# Patient Record
Sex: MALE | Race: BLACK OR AFRICAN AMERICAN | NOT HISPANIC OR LATINO | Employment: FULL TIME | URBAN - METROPOLITAN AREA
[De-identification: names, ages, dates, MRNs, and addresses within clinical notes are randomized per-mention and may not be internally consistent; named-entity substitution may affect disease eponyms.]

---

## 2022-12-06 DIAGNOSIS — I35.0 AORTIC VALVE STENOSIS: Primary | ICD-10-CM

## 2022-12-12 ENCOUNTER — OFFICE VISIT (OUTPATIENT)
Dept: CARDIAC SURGERY | Facility: CLINIC | Age: 62
End: 2022-12-12
Payer: COMMERCIAL

## 2022-12-12 VITALS
WEIGHT: 136 LBS | BODY MASS INDEX: 18.02 KG/M2 | HEIGHT: 73 IN | DIASTOLIC BLOOD PRESSURE: 73 MMHG | SYSTOLIC BLOOD PRESSURE: 127 MMHG | HEART RATE: 82 BPM

## 2022-12-12 DIAGNOSIS — I35.0 AORTIC VALVE STENOSIS: Primary | ICD-10-CM

## 2022-12-12 DIAGNOSIS — I35.0 MODERATE TO SEVERE AORTIC STENOSIS: ICD-10-CM

## 2022-12-12 DIAGNOSIS — I25.10 CORONARY ARTERY DISEASE INVOLVING NATIVE CORONARY ARTERY OF NATIVE HEART WITHOUT ANGINA PECTORIS: ICD-10-CM

## 2022-12-12 DIAGNOSIS — K02.9: ICD-10-CM

## 2022-12-12 PROCEDURE — 1160F RVW MEDS BY RX/DR IN RCRD: CPT | Mod: CPTII,,, | Performed by: THORACIC SURGERY (CARDIOTHORACIC VASCULAR SURGERY)

## 2022-12-12 PROCEDURE — 3078F PR MOST RECENT DIASTOLIC BLOOD PRESSURE < 80 MM HG: ICD-10-PCS | Mod: CPTII,,, | Performed by: THORACIC SURGERY (CARDIOTHORACIC VASCULAR SURGERY)

## 2022-12-12 PROCEDURE — 99205 OFFICE O/P NEW HI 60 MIN: CPT | Mod: ,,, | Performed by: THORACIC SURGERY (CARDIOTHORACIC VASCULAR SURGERY)

## 2022-12-12 PROCEDURE — 1159F PR MEDICATION LIST DOCUMENTED IN MEDICAL RECORD: ICD-10-PCS | Mod: CPTII,,, | Performed by: THORACIC SURGERY (CARDIOTHORACIC VASCULAR SURGERY)

## 2022-12-12 PROCEDURE — 3074F PR MOST RECENT SYSTOLIC BLOOD PRESSURE < 130 MM HG: ICD-10-PCS | Mod: CPTII,,, | Performed by: THORACIC SURGERY (CARDIOTHORACIC VASCULAR SURGERY)

## 2022-12-12 PROCEDURE — 99205 PR OFFICE/OUTPT VISIT, NEW, LEVL V, 60-74 MIN: ICD-10-PCS | Mod: ,,, | Performed by: THORACIC SURGERY (CARDIOTHORACIC VASCULAR SURGERY)

## 2022-12-12 PROCEDURE — 1159F MED LIST DOCD IN RCRD: CPT | Mod: CPTII,,, | Performed by: THORACIC SURGERY (CARDIOTHORACIC VASCULAR SURGERY)

## 2022-12-12 PROCEDURE — 3078F DIAST BP <80 MM HG: CPT | Mod: CPTII,,, | Performed by: THORACIC SURGERY (CARDIOTHORACIC VASCULAR SURGERY)

## 2022-12-12 PROCEDURE — 3074F SYST BP LT 130 MM HG: CPT | Mod: CPTII,,, | Performed by: THORACIC SURGERY (CARDIOTHORACIC VASCULAR SURGERY)

## 2022-12-12 PROCEDURE — 3008F BODY MASS INDEX DOCD: CPT | Mod: CPTII,,, | Performed by: THORACIC SURGERY (CARDIOTHORACIC VASCULAR SURGERY)

## 2022-12-12 PROCEDURE — 1160F PR REVIEW ALL MEDS BY PRESCRIBER/CLIN PHARMACIST DOCUMENTED: ICD-10-PCS | Mod: CPTII,,, | Performed by: THORACIC SURGERY (CARDIOTHORACIC VASCULAR SURGERY)

## 2022-12-12 PROCEDURE — 3008F PR BODY MASS INDEX (BMI) DOCUMENTED: ICD-10-PCS | Mod: CPTII,,, | Performed by: THORACIC SURGERY (CARDIOTHORACIC VASCULAR SURGERY)

## 2022-12-12 RX ORDER — TAMSULOSIN HYDROCHLORIDE 0.4 MG/1
2 CAPSULE ORAL DAILY
COMMUNITY
Start: 2022-11-17

## 2022-12-12 RX ORDER — AMLODIPINE BESYLATE 5 MG/1
5 TABLET ORAL DAILY
COMMUNITY
Start: 2022-11-17

## 2022-12-12 NOTE — ASSESSMENT & PLAN NOTE
Patient needs aortic valve replacement.  Risks, benefits, and alternatives have been discussed.  Questions have been answered.  He will need to have multiple dental extractions prior to aortic valve replacement to obviate the potential for prosthetic valve endocarditis.  I have discussed this with his daughters and they have promised to have this resolved within the next 2 weeks.  I have explained to him the seriousness of the situation and the potential for sudden cardiac death and/or severe myocardial infarction.  He has voiced some concerns and hesitation and says that he will think about it.

## 2022-12-12 NOTE — ASSESSMENT & PLAN NOTE
Patient will need examination and dental extraction prior to AVR/CABG.  Discussed with the patient's daughters and they have found to have this done in the next 2 weeks.   No

## 2022-12-12 NOTE — PROGRESS NOTES
[facility]  Cardiothoracic Surgery  Consult Note    Patient Name: Travis Mckay  MRN: [id number]  Date of Service: 12/12/2022  Referring Provider: Usama Josue II, MD    Patient information was obtained from patient, past medical records, and primary team    Subjective:     Chief Complaint   Patient presents with    Pre-op Exam      RE: CABG/AVR       History of Present Illness:  The patient is a 62-year-old  in [place] who now comes at the request of Dr. Simons to have an evaluation for aortic valve replacement and coronary artery bypass grafting.  Echocardiogram reveals moderate to severe aortic stenosis with an aortic valve area calculated to be 0.9 sq cm by continuity equation, peak systolic velocity of 3.4 m/sec, and a mean aortic valve gradient of 31 mmHg.  The patient's DVI is 0.25 and he has normal left ventricular function.  Of note, his pulmonary artery systolic pressure was estimated to be 40 mmHg on echocardiogram.  Left heart catheterization reveals severe, complex LAD/diagonal disease but is otherwise without evidence of flow-limiting disease.    The patient denies fever, chills, nausea, vomiting, headache, syncope, chest pain, back pain, or paroxysmal nocturnal dyspnea.  He does note some periodic shortness of breath and dyspnea on exertion which comes and goes.    Current Outpatient Medications on File Prior to Visit   Medication Sig    amLODIPine (NORVASC) 5 MG tablet Take 5 mg by mouth once daily.    tamsulosin (FLOMAX) 0.4 mg Cap Take 2 capsules by mouth once daily.     No current facility-administered medications on file prior to visit.       Review of patient's allergies indicates:  No Known Allergies    Past Medical History:   Diagnosis Date    Hypertension      History reviewed. No pertinent surgical history.  Family History    None       Tobacco Use    Smoking status: Every Day     Packs/day: 0.50     Types: Cigarettes     Smokeless tobacco: Never   Substance and Sexual Activity    Alcohol use: Not on file    Drug use: Not on file    Sexual activity: Not on file     Review of Systems   Constitutional: Negative. Negative for chills, diaphoresis, fever and night sweats.   HENT:  Negative for hoarse voice, sore throat and stridor.    Eyes: Negative.  Negative for blurred vision and double vision.   Cardiovascular:  Negative for chest pain, claudication, cyanosis, dyspnea on exertion, irregular heartbeat, leg swelling, orthopnea, palpitations, paroxysmal nocturnal dyspnea and syncope.   Respiratory:  Negative for cough, hemoptysis, shortness of breath, sputum production and wheezing.    Endocrine: Negative for polydipsia and polyuria.   Hematologic/Lymphatic: Negative for adenopathy and bleeding problem.   Gastrointestinal:  Negative for abdominal pain, diarrhea, heartburn, hematemesis, hematochezia, nausea and vomiting.   Neurological:  Negative for brief paralysis, disturbances in coordination, dizziness, focal weakness, headaches, numbness and paresthesias.   Objective:     Vitals:    12/12/22 0829   BP: 127/73   Pulse: 82        Weight: 61.7 kg (136 lb)  Body mass index is 17.94 kg/m².     STS Risk Score: 1/12%    Physical Exam  Vitals reviewed.   Constitutional:       General: He is not in acute distress.     Appearance: He is cachectic.   HENT:      Head: Normocephalic and atraumatic.      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     Nose: No congestion or rhinorrhea.      Mouth/Throat:      Mouth: Mucous membranes are moist.      Dentition: Abnormal dentition. Gingival swelling, dental caries and gum lesions present.      Pharynx: Oropharynx is clear. No oropharyngeal exudate or posterior oropharyngeal erythema.   Eyes:      Extraocular Movements: Extraocular movements intact.      Conjunctiva/sclera: Conjunctivae normal.      Pupils: Pupils are equal, round,  and reactive to light.   Neck:      Thyroid: No thyroid mass.      Vascular: No carotid bruit or JVD.   Cardiovascular:      Rate and Rhythm: Normal rate and regular rhythm.      Pulses: Normal pulses.           Carotid pulses are 2+ on the right side and 2+ on the left side.       Radial pulses are 2+ on the right side and 2+ on the left side.        Femoral pulses are 2+ on the right side and 2+ on the left side.       Dorsalis pedis pulses are 2+ on the right side and 2+ on the left side.        Posterior tibial pulses are 2+ on the right side and 2+ on the left side.      Heart sounds: Murmur heard.   Harsh midsystolic murmur is present with a grade of 3/6 at the upper right sternal border radiating to the neck.   High-pitched blowing decrescendo early diastolic murmur is present at the upper right sternal border radiating to the apex.     No friction rub. No gallop.   Pulmonary:      Effort: Pulmonary effort is normal. No respiratory distress.      Breath sounds: No wheezing, rhonchi or rales.   Chest:      Chest wall: No tenderness.   Abdominal:      General: Abdomen is flat. Bowel sounds are normal. There is no distension.      Palpations: Abdomen is soft. There is no mass.      Tenderness: There is no abdominal tenderness.   Musculoskeletal:         General: No swelling. Normal range of motion.      Cervical back: Normal range of motion and neck supple.      Right lower leg: No edema.      Left lower leg: No edema.   Lymphadenopathy:      Cervical: No cervical adenopathy.      Upper Body:      Right upper body: No supraclavicular or axillary adenopathy.      Left upper body: No supraclavicular or axillary adenopathy.   Skin:     General: Skin is warm and dry.   Neurological:      General: No focal deficit present.      Mental Status: He is alert and oriented to person, place, and time.      Cranial Nerves: No cranial nerve deficit.      Sensory: No sensory deficit.      Motor: No weakness.   Psychiatric:          Mood and Affect: Mood normal.        Significant Labs:  Reviewed    Significant Diagnostics:  Reviewed    Assessment/Plan:     Problem List Items Addressed This Visit          ENT    Class VI dental caries     Patient will need examination and dental extraction prior to AVR/CABG.  Discussed with the patient's daughters and they have found to have this done in the next 2 weeks.            Cardiac/Vascular    Moderate to severe aortic stenosis     Patient needs aortic valve replacement.  Risks, benefits, and alternatives have been discussed.  Questions have been answered.  He will need to have multiple dental extractions prior to aortic valve replacement to obviate the potential for prosthetic valve endocarditis.  I have discussed this with his daughters and they have promised to have this resolved within the next 2 weeks.  I have explained to him the seriousness of the situation and the potential for sudden cardiac death and/or severe myocardial infarction.  He has voiced some concerns and hesitation and says that he will think about it.         Coronary artery disease involving native coronary artery of native heart without angina pectoris     Risks, benefits, and alternatives have been discussed.  Questions have been answered.  The patient voices understanding and agrees to proceed however he is having some hesitation and understands that he must have dental extractions prior to aortic valve replacement and coronary artery bypass grafting.  His daughters have agreed to assist him with this in the next couple of weeks and he desires to have revisited station and scheduling of potential surgery after [place].          Other Visit Diagnoses       Aortic valve stenosis                 Thank you for your consult. I will follow-up with patient. Please contact us if you have any additional questions.    TOMMY Sparks MD, FACC, FACS  Cardiothoracic Surgery  [facility]

## 2022-12-12 NOTE — ASSESSMENT & PLAN NOTE
Risks, benefits, and alternatives have been discussed.  Questions have been answered.  The patient voices understanding and agrees to proceed however he is having some hesitation and understands that he must have dental extractions prior to aortic valve replacement and coronary artery bypass grafting.  His daughters have agreed to assist him with this in the next couple of weeks and he desires to have revisited station and scheduling of potential surgery after [place].

## 2022-12-27 ENCOUNTER — TELEPHONE (OUTPATIENT)
Dept: CARDIAC SURGERY | Facility: CLINIC | Age: 62
End: 2022-12-27
Payer: COMMERCIAL

## 2022-12-27 NOTE — TELEPHONE ENCOUNTER
Family states having trouble getting Dentist to pull teeth, states they have been going back and for the with the Cardiologist.  Instructed family to reach out to Dr. Josue-Cardiologist to see if they will approve him to have surgery to extract/pull teeth.  Pt has f/u appt with Dr. Sparks on 1/9/23 inst if unable to get teeth pulled by then can discuss with Dr. Sparks.  Verb understanding.   ----- Message from Mariajose Rodas sent at 12/27/2022  2:23 PM CST -----  Lianet called said the dentist said that he will not pull his teeth before sx, but Dr. Sparks told them to have it done before the sx. Lianet is not sure what to do.    [phone]

## 2023-01-05 ENCOUNTER — TELEPHONE (OUTPATIENT)
Dept: CARDIAC SURGERY | Facility: CLINIC | Age: 63
End: 2023-01-05
Payer: COMMERCIAL

## 2023-01-05 NOTE — TELEPHONE ENCOUNTER
Dr. Sparks states see pt in office after dental surgery for teeth extraction, cancelled appt for 1/9/23.  Spoke with spouse and states Dr. Trevon Kunz Dentist refused to perform dental extraction surgery because they did not get surgery clearance from Dr. Josue.  Called Dr. Josue's office and states pt was high risk but did not say pt couldn't have surgery.  Nurse states she is going to talk to RUSSELL Choi because Dr. Josue out of office until next week.  Informed spouse as soon as I hear back from MD I will let her know.  Verb understanding.

## 2023-01-09 ENCOUNTER — TELEPHONE (OUTPATIENT)
Dept: CARDIAC SURGERY | Facility: CLINIC | Age: 63
End: 2023-01-09

## 2023-01-09 NOTE — TELEPHONE ENCOUNTER
Returned call to Mrs. Guy. Informed her that we are currently working on the clearance and pending a call back from the cardiologist. She verbalized understanding, and asked that we call her back as soon as we know something. MARK ANTHONY Hinds RN

## 2023-01-30 ENCOUNTER — OFFICE VISIT (OUTPATIENT)
Dept: CARDIAC SURGERY | Facility: CLINIC | Age: 63
End: 2023-01-30
Payer: COMMERCIAL

## 2023-01-30 ENCOUNTER — TELEPHONE (OUTPATIENT)
Dept: CARDIAC SURGERY | Facility: CLINIC | Age: 63
End: 2023-01-30

## 2023-01-30 VITALS
BODY MASS INDEX: 18.29 KG/M2 | DIASTOLIC BLOOD PRESSURE: 76 MMHG | WEIGHT: 138 LBS | HEART RATE: 74 BPM | HEIGHT: 73 IN | SYSTOLIC BLOOD PRESSURE: 140 MMHG

## 2023-01-30 DIAGNOSIS — I25.10 CORONARY ARTERY DISEASE INVOLVING NATIVE CORONARY ARTERY OF NATIVE HEART WITHOUT ANGINA PECTORIS: ICD-10-CM

## 2023-01-30 DIAGNOSIS — I35.0 MODERATE TO SEVERE AORTIC STENOSIS: ICD-10-CM

## 2023-01-30 DIAGNOSIS — I35.0 MODERATE TO SEVERE AORTIC STENOSIS: Primary | ICD-10-CM

## 2023-01-30 PROCEDURE — 99215 OFFICE O/P EST HI 40 MIN: CPT | Mod: ,,, | Performed by: THORACIC SURGERY (CARDIOTHORACIC VASCULAR SURGERY)

## 2023-01-30 PROCEDURE — 3077F SYST BP >= 140 MM HG: CPT | Mod: CPTII,,, | Performed by: THORACIC SURGERY (CARDIOTHORACIC VASCULAR SURGERY)

## 2023-01-30 PROCEDURE — 3078F PR MOST RECENT DIASTOLIC BLOOD PRESSURE < 80 MM HG: ICD-10-PCS | Mod: CPTII,,, | Performed by: THORACIC SURGERY (CARDIOTHORACIC VASCULAR SURGERY)

## 2023-01-30 PROCEDURE — 1160F RVW MEDS BY RX/DR IN RCRD: CPT | Mod: CPTII,,, | Performed by: THORACIC SURGERY (CARDIOTHORACIC VASCULAR SURGERY)

## 2023-01-30 PROCEDURE — 1159F PR MEDICATION LIST DOCUMENTED IN MEDICAL RECORD: ICD-10-PCS | Mod: CPTII,,, | Performed by: THORACIC SURGERY (CARDIOTHORACIC VASCULAR SURGERY)

## 2023-01-30 PROCEDURE — 3008F PR BODY MASS INDEX (BMI) DOCUMENTED: ICD-10-PCS | Mod: CPTII,,, | Performed by: THORACIC SURGERY (CARDIOTHORACIC VASCULAR SURGERY)

## 2023-01-30 PROCEDURE — 99215 PR OFFICE/OUTPT VISIT, EST, LEVL V, 40-54 MIN: ICD-10-PCS | Mod: ,,, | Performed by: THORACIC SURGERY (CARDIOTHORACIC VASCULAR SURGERY)

## 2023-01-30 PROCEDURE — 3078F DIAST BP <80 MM HG: CPT | Mod: CPTII,,, | Performed by: THORACIC SURGERY (CARDIOTHORACIC VASCULAR SURGERY)

## 2023-01-30 PROCEDURE — 3077F PR MOST RECENT SYSTOLIC BLOOD PRESSURE >= 140 MM HG: ICD-10-PCS | Mod: CPTII,,, | Performed by: THORACIC SURGERY (CARDIOTHORACIC VASCULAR SURGERY)

## 2023-01-30 PROCEDURE — 1160F PR REVIEW ALL MEDS BY PRESCRIBER/CLIN PHARMACIST DOCUMENTED: ICD-10-PCS | Mod: CPTII,,, | Performed by: THORACIC SURGERY (CARDIOTHORACIC VASCULAR SURGERY)

## 2023-01-30 PROCEDURE — 3008F BODY MASS INDEX DOCD: CPT | Mod: CPTII,,, | Performed by: THORACIC SURGERY (CARDIOTHORACIC VASCULAR SURGERY)

## 2023-01-30 PROCEDURE — 1159F MED LIST DOCD IN RCRD: CPT | Mod: CPTII,,, | Performed by: THORACIC SURGERY (CARDIOTHORACIC VASCULAR SURGERY)

## 2023-01-30 RX ORDER — MUPIROCIN 20 MG/G
OINTMENT TOPICAL
Status: CANCELLED | OUTPATIENT
Start: 2023-01-30 | End: 2023-01-30

## 2023-01-30 RX ORDER — MUPIROCIN 20 MG/G
OINTMENT TOPICAL DAILY
COMMUNITY
Start: 2023-02-15 | End: 2023-01-30 | Stop reason: SDUPTHER

## 2023-01-30 RX ORDER — MUPIROCIN 20 MG/G
OINTMENT TOPICAL DAILY
Qty: 1 EACH | Refills: 0 | Status: ON HOLD | OUTPATIENT
Start: 2023-02-15 | End: 2023-02-20 | Stop reason: HOSPADM

## 2023-01-30 RX ORDER — HYDROCODONE BITARTRATE AND ACETAMINOPHEN 500; 5 MG/1; MG/1
TABLET ORAL
Status: CANCELLED | OUTPATIENT
Start: 2023-01-30

## 2023-01-30 NOTE — H&P (VIEW-ONLY)
[facility]  Cardiothoracic Surgery  Consult Note    Patient Name: Travis Mckay  MRN: [id number]  Date of Service: 1/30/2023  Referring Provider: No ref. provider found    Patient information was obtained from patient, past medical records, and primary team    Subjective:     Chief Complaint   Patient presents with    Follow-up     Discuss AVR/CABG       History of Present Illness:  The patient is a 62-year-old  in [place] who now comes at the request of Dr. Simons to have an evaluation for aortic valve replacement and coronary artery bypass grafting.  Echocardiogram reveals moderate to severe aortic stenosis with an aortic valve area calculated to be 0.9 sq cm by continuity equation, peak systolic velocity of 3.4 m/sec, and a mean aortic valve gradient of 31 mmHg.  The patient's DVI is 0.25 and he has normal left ventricular function.  Of note, his pulmonary artery systolic pressure was estimated to be 40 mmHg on echocardiogram.  Left heart catheterization reveals severe, complex LAD/diagonal disease but is otherwise without evidence of flow-limiting disease.    The patient denies fever, chills, nausea, vomiting, headache, syncope, chest pain, back pain, or paroxysmal nocturnal dyspnea.  He does note some periodic shortness of breath and dyspnea on exertion which comes and goes.    The patient now returns following extraction of multiple teeth and he is now ready for surgical intervention.    Current Outpatient Medications on File Prior to Visit   Medication Sig    amLODIPine (NORVASC) 5 MG tablet Take 5 mg by mouth once daily.    tamsulosin (FLOMAX) 0.4 mg Cap Take 2 capsules by mouth once daily.     No current facility-administered medications on file prior to visit.       Review of patient's allergies indicates:  No Known Allergies    Past Medical History:   Diagnosis Date    Hypertension      History reviewed. No pertinent surgical history.  Family History     None       Tobacco Use    Smoking status: Every Day     Packs/day: 0.50     Types: Cigarettes    Smokeless tobacco: Never   Substance and Sexual Activity    Alcohol use: Not on file    Drug use: Not on file    Sexual activity: Not on file     Review of Systems   Constitutional: Negative. Negative for chills, diaphoresis, fever and night sweats.   HENT:  Negative for hoarse voice, sore throat and stridor.    Eyes: Negative.  Negative for blurred vision and double vision.   Cardiovascular:  Negative for chest pain, claudication, cyanosis, dyspnea on exertion, irregular heartbeat, leg swelling, orthopnea, palpitations, paroxysmal nocturnal dyspnea and syncope.   Respiratory:  Negative for cough, hemoptysis, shortness of breath, sputum production and wheezing.    Endocrine: Negative for polydipsia and polyuria.   Hematologic/Lymphatic: Negative for adenopathy and bleeding problem.   Gastrointestinal:  Negative for abdominal pain, diarrhea, heartburn, hematemesis, hematochezia, nausea and vomiting.   Neurological:  Negative for brief paralysis, disturbances in coordination, dizziness, focal weakness, headaches, numbness and paresthesias.   Objective:     Vitals:    01/30/23 0808   BP: (!) 140/76   Pulse: 74        Weight: 62.6 kg (138 lb)  Body mass index is 18.21 kg/m².     STS Risk Score: 1/12%    Physical Exam  Vitals reviewed.   Constitutional:       General: He is not in acute distress.     Appearance: He is cachectic.   HENT:      Head: Normocephalic and atraumatic.      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     Nose: No congestion or rhinorrhea.      Mouth/Throat:      Mouth: Mucous membranes are moist.      Dentition: Abnormal dentition. Gingival swelling, dental caries and gum lesions present.      Pharynx: Oropharynx is clear. No oropharyngeal exudate or posterior oropharyngeal erythema.   Eyes:      Extraocular Movements: Extraocular  movements intact.      Conjunctiva/sclera: Conjunctivae normal.      Pupils: Pupils are equal, round, and reactive to light.   Neck:      Thyroid: No thyroid mass.      Vascular: No carotid bruit or JVD.   Cardiovascular:      Rate and Rhythm: Normal rate and regular rhythm.      Pulses: Normal pulses.           Carotid pulses are 2+ on the right side and 2+ on the left side.       Radial pulses are 2+ on the right side and 2+ on the left side.        Femoral pulses are 2+ on the right side and 2+ on the left side.       Dorsalis pedis pulses are 2+ on the right side and 2+ on the left side.        Posterior tibial pulses are 2+ on the right side and 2+ on the left side.      Heart sounds: Murmur heard.   Harsh midsystolic murmur is present with a grade of 3/6 at the upper right sternal border radiating to the neck.   High-pitched blowing decrescendo early diastolic murmur is present at the upper right sternal border radiating to the apex.     No friction rub. No gallop.   Pulmonary:      Effort: Pulmonary effort is normal. No respiratory distress.      Breath sounds: No wheezing, rhonchi or rales.   Chest:      Chest wall: No tenderness.   Abdominal:      General: Abdomen is flat. Bowel sounds are normal. There is no distension.      Palpations: Abdomen is soft. There is no mass.      Tenderness: There is no abdominal tenderness.   Musculoskeletal:         General: No swelling. Normal range of motion.      Cervical back: Normal range of motion and neck supple.      Right lower leg: No edema.      Left lower leg: No edema.   Lymphadenopathy:      Cervical: No cervical adenopathy.      Upper Body:      Right upper body: No supraclavicular or axillary adenopathy.      Left upper body: No supraclavicular or axillary adenopathy.   Skin:     General: Skin is warm and dry.   Neurological:      General: No focal deficit present.      Mental Status: He is alert and oriented to person, place, and time.      Cranial Nerves:  No cranial nerve deficit.      Sensory: No sensory deficit.      Motor: No weakness.   Psychiatric:         Mood and Affect: Mood normal.        Significant Labs:  Reviewed    Significant Diagnostics:  Reviewed    Assessment/Plan:     Problem List Items Addressed This Visit          Cardiac/Vascular    Moderate to severe aortic stenosis     Will schedule aortic valve replacement with coronary artery bypass grafting on February 16, 2023.  Risks, benefits, and alternatives have been discussed.  Questions have been answered.  The patient voices understanding and agrees to proceed.         Coronary artery disease involving native coronary artery of native heart without angina pectoris     Will plan coronary artery bypass grafting on February 16, 2023.  Risks, benefits, and alternatives have been discussed.               Thank you for your consult. I will follow-up with patient. Please contact us if you have any additional questions.    TOMMY Sparks MD, FACC, FACS  Cardiothoracic Surgery  [facility]

## 2023-01-30 NOTE — PROGRESS NOTES
[facility]  Cardiothoracic Surgery  Consult Note    Patient Name: Travis Mckay  MRN: [id number]  Date of Service: 1/30/2023  Referring Provider: No ref. provider found    Patient information was obtained from patient, past medical records, and primary team    Subjective:     Chief Complaint   Patient presents with    Follow-up     Discuss AVR/CABG       History of Present Illness:  The patient is a 62-year-old  in [place] who now comes at the request of Dr. Simons to have an evaluation for aortic valve replacement and coronary artery bypass grafting.  Echocardiogram reveals moderate to severe aortic stenosis with an aortic valve area calculated to be 0.9 sq cm by continuity equation, peak systolic velocity of 3.4 m/sec, and a mean aortic valve gradient of 31 mmHg.  The patient's DVI is 0.25 and he has normal left ventricular function.  Of note, his pulmonary artery systolic pressure was estimated to be 40 mmHg on echocardiogram.  Left heart catheterization reveals severe, complex LAD/diagonal disease but is otherwise without evidence of flow-limiting disease.    The patient denies fever, chills, nausea, vomiting, headache, syncope, chest pain, back pain, or paroxysmal nocturnal dyspnea.  He does note some periodic shortness of breath and dyspnea on exertion which comes and goes.    The patient now returns following extraction of multiple teeth and he is now ready for surgical intervention.    Current Outpatient Medications on File Prior to Visit   Medication Sig    amLODIPine (NORVASC) 5 MG tablet Take 5 mg by mouth once daily.    tamsulosin (FLOMAX) 0.4 mg Cap Take 2 capsules by mouth once daily.     No current facility-administered medications on file prior to visit.       Review of patient's allergies indicates:  No Known Allergies    Past Medical History:   Diagnosis Date    Hypertension      History reviewed. No pertinent surgical history.  Family History     None       Tobacco Use    Smoking status: Every Day     Packs/day: 0.50     Types: Cigarettes    Smokeless tobacco: Never   Substance and Sexual Activity    Alcohol use: Not on file    Drug use: Not on file    Sexual activity: Not on file     Review of Systems   Constitutional: Negative. Negative for chills, diaphoresis, fever and night sweats.   HENT:  Negative for hoarse voice, sore throat and stridor.    Eyes: Negative.  Negative for blurred vision and double vision.   Cardiovascular:  Negative for chest pain, claudication, cyanosis, dyspnea on exertion, irregular heartbeat, leg swelling, orthopnea, palpitations, paroxysmal nocturnal dyspnea and syncope.   Respiratory:  Negative for cough, hemoptysis, shortness of breath, sputum production and wheezing.    Endocrine: Negative for polydipsia and polyuria.   Hematologic/Lymphatic: Negative for adenopathy and bleeding problem.   Gastrointestinal:  Negative for abdominal pain, diarrhea, heartburn, hematemesis, hematochezia, nausea and vomiting.   Neurological:  Negative for brief paralysis, disturbances in coordination, dizziness, focal weakness, headaches, numbness and paresthesias.   Objective:     Vitals:    01/30/23 0808   BP: (!) 140/76   Pulse: 74        Weight: 62.6 kg (138 lb)  Body mass index is 18.21 kg/m².     STS Risk Score: 1/12%    Physical Exam  Vitals reviewed.   Constitutional:       General: He is not in acute distress.     Appearance: He is cachectic.   HENT:      Head: Normocephalic and atraumatic.      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     Nose: No congestion or rhinorrhea.      Mouth/Throat:      Mouth: Mucous membranes are moist.      Dentition: Abnormal dentition. Gingival swelling, dental caries and gum lesions present.      Pharynx: Oropharynx is clear. No oropharyngeal exudate or posterior oropharyngeal erythema.   Eyes:      Extraocular Movements: Extraocular  movements intact.      Conjunctiva/sclera: Conjunctivae normal.      Pupils: Pupils are equal, round, and reactive to light.   Neck:      Thyroid: No thyroid mass.      Vascular: No carotid bruit or JVD.   Cardiovascular:      Rate and Rhythm: Normal rate and regular rhythm.      Pulses: Normal pulses.           Carotid pulses are 2+ on the right side and 2+ on the left side.       Radial pulses are 2+ on the right side and 2+ on the left side.        Femoral pulses are 2+ on the right side and 2+ on the left side.       Dorsalis pedis pulses are 2+ on the right side and 2+ on the left side.        Posterior tibial pulses are 2+ on the right side and 2+ on the left side.      Heart sounds: Murmur heard.   Harsh midsystolic murmur is present with a grade of 3/6 at the upper right sternal border radiating to the neck.   High-pitched blowing decrescendo early diastolic murmur is present at the upper right sternal border radiating to the apex.     No friction rub. No gallop.   Pulmonary:      Effort: Pulmonary effort is normal. No respiratory distress.      Breath sounds: No wheezing, rhonchi or rales.   Chest:      Chest wall: No tenderness.   Abdominal:      General: Abdomen is flat. Bowel sounds are normal. There is no distension.      Palpations: Abdomen is soft. There is no mass.      Tenderness: There is no abdominal tenderness.   Musculoskeletal:         General: No swelling. Normal range of motion.      Cervical back: Normal range of motion and neck supple.      Right lower leg: No edema.      Left lower leg: No edema.   Lymphadenopathy:      Cervical: No cervical adenopathy.      Upper Body:      Right upper body: No supraclavicular or axillary adenopathy.      Left upper body: No supraclavicular or axillary adenopathy.   Skin:     General: Skin is warm and dry.   Neurological:      General: No focal deficit present.      Mental Status: He is alert and oriented to person, place, and time.      Cranial Nerves:  No cranial nerve deficit.      Sensory: No sensory deficit.      Motor: No weakness.   Psychiatric:         Mood and Affect: Mood normal.        Significant Labs:  Reviewed    Significant Diagnostics:  Reviewed    Assessment/Plan:     Problem List Items Addressed This Visit          Cardiac/Vascular    Moderate to severe aortic stenosis     Will schedule aortic valve replacement with coronary artery bypass grafting on February 16, 2023.  Risks, benefits, and alternatives have been discussed.  Questions have been answered.  The patient voices understanding and agrees to proceed.         Coronary artery disease involving native coronary artery of native heart without angina pectoris     Will plan coronary artery bypass grafting on February 16, 2023.  Risks, benefits, and alternatives have been discussed.               Thank you for your consult. I will follow-up with patient. Please contact us if you have any additional questions.    TOMMY Sparks MD, FACC, FACS  Cardiothoracic Surgery  [facility]

## 2023-01-30 NOTE — ASSESSMENT & PLAN NOTE
Will plan coronary artery bypass grafting on February 16, 2023.  Risks, benefits, and alternatives have been discussed.

## 2023-01-30 NOTE — ASSESSMENT & PLAN NOTE
Will schedule aortic valve replacement with coronary artery bypass grafting on February 16, 2023.  Risks, benefits, and alternatives have been discussed.  Questions have been answered.  The patient voices understanding and agrees to proceed.

## 2023-02-13 ENCOUNTER — HOSPITAL ENCOUNTER (OUTPATIENT)
Dept: RADIOLOGY | Facility: HOSPITAL | Age: 63
Discharge: HOME | DRG: 220 | End: 2023-02-13
Attending: THORACIC SURGERY (CARDIOTHORACIC VASCULAR SURGERY)
Payer: COMMERCIAL

## 2023-02-13 DIAGNOSIS — I35.0 MODERATE TO SEVERE AORTIC STENOSIS: ICD-10-CM

## 2023-02-13 DIAGNOSIS — I25.10 CORONARY ARTERY DISEASE INVOLVING NATIVE CORONARY ARTERY OF NATIVE HEART WITHOUT ANGINA PECTORIS: ICD-10-CM

## 2023-02-13 PROCEDURE — 86923 COMPATIBILITY TEST ELECTRIC: CPT | Mod: 91 | Performed by: THORACIC SURGERY (CARDIOTHORACIC VASCULAR SURGERY)

## 2023-02-13 PROCEDURE — 71046 X-RAY EXAM CHEST 2 VIEWS: CPT | Mod: TC

## 2023-02-14 ENCOUNTER — ANESTHESIA EVENT (OUTPATIENT)
Dept: SURGERY | Facility: HOSPITAL | Age: 63
DRG: 220 | End: 2023-02-14
Payer: COMMERCIAL

## 2023-02-16 ENCOUNTER — ANESTHESIA (OUTPATIENT)
Dept: SURGERY | Facility: HOSPITAL | Age: 63
DRG: 220 | End: 2023-02-16
Payer: COMMERCIAL

## 2023-02-16 ENCOUNTER — HOSPITAL ENCOUNTER (INPATIENT)
Facility: HOSPITAL | Age: 63
LOS: 4 days | Discharge: HOME | DRG: 220 | End: 2023-02-20
Attending: THORACIC SURGERY (CARDIOTHORACIC VASCULAR SURGERY)
Payer: COMMERCIAL

## 2023-02-16 DIAGNOSIS — I25.10 CAD (CORONARY ARTERY DISEASE): ICD-10-CM

## 2023-02-16 DIAGNOSIS — I49.9 ARRHYTHMIA: ICD-10-CM

## 2023-02-16 DIAGNOSIS — R00.0 SINUS TACHYCARDIA: ICD-10-CM

## 2023-02-16 DIAGNOSIS — I35.0 MODERATE TO SEVERE AORTIC STENOSIS: ICD-10-CM

## 2023-02-16 DIAGNOSIS — I25.10 CORONARY ARTERY DISEASE INVOLVING NATIVE CORONARY ARTERY OF NATIVE HEART WITHOUT ANGINA PECTORIS: ICD-10-CM

## 2023-02-16 DIAGNOSIS — I35.0 AORTIC STENOSIS: ICD-10-CM

## 2023-02-16 LAB
ABO + RH BLD: NORMAL
ANION GAP SERPL CALC-SCNC: 6 MEQ/L
ANION GAP SERPL CALC-SCNC: 9 MEQ/L
APTT PPP: 34.3 SECONDS (ref 23.2–33.7)
APTT PPP: 43 SECONDS (ref 23.2–33.7)
BASE EXCESS ARTERIAL: NORMAL
BASOPHILS # BLD AUTO: 0.07 X10(3)/MCL (ref 0–0.2)
BASOPHILS # BLD AUTO: 0.1 X10(3)/MCL (ref 0–0.2)
BASOPHILS NFR BLD AUTO: 0.5 %
BASOPHILS NFR BLD AUTO: 0.6 %
BLD PROD TYP BPU: NORMAL
BLOOD UNIT EXPIRATION DATE: NORMAL
BLOOD UNIT TYPE CODE: 5100
BLOOD UNIT TYPE CODE: 9500
BSA FOR ECHO PROCEDURE: 1.7 M2
BUN SERPL-MCNC: 14.2 MG/DL (ref 8.4–25.7)
BUN SERPL-MCNC: 14.8 MG/DL (ref 8.4–25.7)
CALCIUM SERPL-MCNC: 8.4 MG/DL (ref 8.8–10)
CALCIUM SERPL-MCNC: 8.6 MG/DL (ref 8.8–10)
CHLORIDE SERPL-SCNC: 112 MMOL/L (ref 98–107)
CHLORIDE SERPL-SCNC: 113 MMOL/L (ref 98–107)
CO2 SERPL-SCNC: 21 MMOL/L (ref 23–31)
CO2 SERPL-SCNC: 25 MMOL/L (ref 23–31)
CORRECTED TEMPERATURE (PCO2): 53 MMHG (ref 19–50)
CORRECTED TEMPERATURE (PH): 7.29 (ref 7.35–7.45)
CORRECTED TEMPERATURE (PO2): 51 MMHG
CREAT SERPL-MCNC: 0.98 MG/DL (ref 0.73–1.18)
CREAT SERPL-MCNC: 1.03 MG/DL (ref 0.73–1.18)
CREAT/UREA NIT SERPL: 14
CREAT/UREA NIT SERPL: 14
CROSSMATCH INTERPRETATION: NORMAL
DISPENSE STATUS: NORMAL
EOSINOPHIL # BLD AUTO: 0.05 X10(3)/MCL (ref 0–0.9)
EOSINOPHIL # BLD AUTO: 0.09 X10(3)/MCL (ref 0–0.9)
EOSINOPHIL NFR BLD AUTO: 0.2 %
EOSINOPHIL NFR BLD AUTO: 0.7 %
ERYTHROCYTE [DISTWIDTH] IN BLOOD BY AUTOMATED COUNT: 16.6 % (ref 11.5–17)
ERYTHROCYTE [DISTWIDTH] IN BLOOD BY AUTOMATED COUNT: 16.7 % (ref 11.5–17)
GFR SERPLBLD CREATININE-BSD FMLA CKD-EPI: >60 MLS/MIN/1.73/M2
GFR SERPLBLD CREATININE-BSD FMLA CKD-EPI: >60 MLS/MIN/1.73/M2
GLUCOSE SERPL-MCNC: 129 MG/DL (ref 82–115)
GLUCOSE SERPL-MCNC: 96 MG/DL (ref 82–115)
HCO3 ARTERIAL: NORMAL
HCO3 UR-SCNC: 25.5 MMOL/L (ref 22–26)
HCT VFR BLD AUTO: 26.2 % (ref 42–52)
HCT VFR BLD AUTO: 31.1 % (ref 42–52)
HGB BLD-MCNC: 10.4 GM/DL (ref 14–18)
HGB BLD-MCNC: 10.5 G/DL (ref 12–16)
HGB BLD-MCNC: 8.6 GM/DL (ref 14–18)
HGB BLD-MCNC: NORMAL G/DL
IMM GRANULOCYTES # BLD AUTO: 0.25 X10(3)/MCL (ref 0–0.04)
IMM GRANULOCYTES # BLD AUTO: 0.3 X10(3)/MCL (ref 0–0.04)
IMM GRANULOCYTES NFR BLD AUTO: 1.5 %
IMM GRANULOCYTES NFR BLD AUTO: 2 %
INR BLD: 1.4 (ref 0–1.3)
INR BLD: 1.84 (ref 0–1.3)
LYMPHOCYTES # BLD AUTO: 0.81 X10(3)/MCL (ref 0.6–4.6)
LYMPHOCYTES # BLD AUTO: 1.11 X10(3)/MCL (ref 0.6–4.6)
LYMPHOCYTES NFR BLD AUTO: 5.5 %
LYMPHOCYTES NFR BLD AUTO: 6.4 %
MAGNESIUM SERPL-MCNC: 2.6 MG/DL (ref 1.6–2.6)
MCH RBC QN AUTO: 28.9 PG
MCH RBC QN AUTO: 29 PG
MCHC RBC AUTO-ENTMCNC: 32.8 MG/DL (ref 33–36)
MCHC RBC AUTO-ENTMCNC: 33.4 MG/DL (ref 33–36)
MCV RBC AUTO: 86.4 FL (ref 80–94)
MCV RBC AUTO: 88.2 FL (ref 80–94)
MONOCYTES # BLD AUTO: 0.37 X10(3)/MCL (ref 0.1–1.3)
MONOCYTES # BLD AUTO: 0.8 X10(3)/MCL (ref 0.1–1.3)
MONOCYTES NFR BLD AUTO: 2.9 %
MONOCYTES NFR BLD AUTO: 4 %
NEUTROPHILS # BLD AUTO: 11.09 X10(3)/MCL (ref 2.1–9.2)
NEUTROPHILS # BLD AUTO: 17.67 X10(3)/MCL (ref 2.1–9.2)
NEUTROPHILS NFR BLD AUTO: 87.4 %
NEUTROPHILS NFR BLD AUTO: 88.3 %
NRBC BLD AUTO-RTO: 0 %
NRBC BLD AUTO-RTO: 0 %
PCO2 BLDA: 46 MMHG
PCO2 BLDA: 53 MMHG (ref 19–50)
PCO2 BLDA: NORMAL MM[HG]
PCO2 BLDA: NORMAL MM[HG]
PH SMN: 7.29 [PH] (ref 7.35–7.45)
PH SMN: 7.39 [PH]
PH SMN: NORMAL [PH]
PHOSPHATE SERPL-MCNC: 3.3 MG/DL (ref 2.3–4.7)
PLATELET # BLD AUTO: 206 X10(3)/MCL (ref 130–400)
PLATELET # BLD AUTO: 272 X10(3)/MCL (ref 130–400)
PMV BLD AUTO: 9.7 FL (ref 7.4–10.4)
PMV BLD AUTO: 9.9 FL (ref 7.4–10.4)
PO2 BLDA: 51 MMHG
PO2 BLDA: 89 MMHG
PO2 BLDA: NORMAL MM[HG]
POC BASE DEFICIT: -1.5 MMOL/L (ref -2–2)
POC BASE DEFICIT: 2.3 MMOL/L
POC COHB: 1.8 %
POC COHB: NORMAL
POC FIO2: NORMAL
POC HCO3: 27.8 MMOL/L
POC IONIZED CALCIUM: 1.19 MMOL/L
POC IONIZED CALCIUM: 1.31 MMOL/L (ref 1.12–1.23)
POC IONIZED CALCIUM: NORMAL
POC METHB: 1.3 % (ref 0.4–1.5)
POC METHB: NORMAL
POC O2HB: 86.7 % (ref 94–97)
POC O2HB: NORMAL
POC SATURATED O2: 81 %
POC SATURATED O2: 97 %
POC TEMPERATURE: 37 C
POC TEMPERATURE: 37 °C
POCT GLUCOSE: 106 MG/DL (ref 70–110)
POCT GLUCOSE: 108 MG/DL (ref 70–110)
POCT GLUCOSE: 111 MG/DL (ref 70–110)
POCT GLUCOSE: 127 MG/DL (ref 70–110)
POCT GLUCOSE: 131 MG/DL (ref 70–110)
POCT GLUCOSE: 73 MG/DL (ref 70–110)
POCT GLUCOSE: 80 MG/DL (ref 70–110)
POCT GLUCOSE: 85 MG/DL (ref 70–110)
POTASSIUM BLD-SCNC: 3.7 MMOL/L (ref 3.5–5)
POTASSIUM BLD-SCNC: 4.1 MMOL/L
POTASSIUM BLD-SCNC: NORMAL MMOL/L
POTASSIUM SERPL-SCNC: 3.9 MMOL/L (ref 3.5–5.1)
POTASSIUM SERPL-SCNC: 4 MMOL/L (ref 3.5–5.1)
POTASSIUM SERPL-SCNC: 4.5 MMOL/L (ref 3.5–5.1)
POTASSIUM SERPL-SCNC: 5.1 MMOL/L (ref 3.5–5.1)
PROTHROMBIN TIME: 16.9 SECONDS (ref 12.5–14.5)
PROTHROMBIN TIME: 21 SECONDS (ref 12.5–14.5)
RBC # BLD AUTO: 2.97 X10(6)/MCL (ref 4.7–6.1)
RBC # BLD AUTO: 3.6 X10(6)/MCL (ref 4.7–6.1)
SATURATED O2 ARTERIAL, I-STAT: NORMAL
SODIUM BLD-SCNC: 138 MMOL/L
SODIUM BLD-SCNC: 138 MMOL/L (ref 137–145)
SODIUM BLD-SCNC: NORMAL MMOL/L
SODIUM SERPL-SCNC: 142 MMOL/L (ref 136–145)
SODIUM SERPL-SCNC: 144 MMOL/L (ref 136–145)
SPECIMEN SOURCE: ABNORMAL
SPECIMEN SOURCE: ABNORMAL
UNIT NUMBER: NORMAL
WBC # SPEC AUTO: 12.7 X10(3)/MCL (ref 4.5–11.5)
WBC # SPEC AUTO: 20 X10(3)/MCL (ref 4.5–11.5)

## 2023-02-16 PROCEDURE — 83735 ASSAY OF MAGNESIUM: CPT | Performed by: PHYSICIAN ASSISTANT

## 2023-02-16 PROCEDURE — 27201423 OPTIME MED/SURG SUP & DEVICES STERILE SUPPLY: Performed by: THORACIC SURGERY (CARDIOTHORACIC VASCULAR SURGERY)

## 2023-02-16 PROCEDURE — 86965 POOLING BLOOD PLATELETS: CPT | Performed by: THORACIC SURGERY (CARDIOTHORACIC VASCULAR SURGERY)

## 2023-02-16 PROCEDURE — 84100 ASSAY OF PHOSPHORUS: CPT | Performed by: PHYSICIAN ASSISTANT

## 2023-02-16 PROCEDURE — 85730 THROMBOPLASTIN TIME PARTIAL: CPT | Performed by: THORACIC SURGERY (CARDIOTHORACIC VASCULAR SURGERY)

## 2023-02-16 PROCEDURE — 25000003 PHARM REV CODE 250: Performed by: THORACIC SURGERY (CARDIOTHORACIC VASCULAR SURGERY)

## 2023-02-16 PROCEDURE — P9035 PLATELET PHERES LEUKOREDUCED: HCPCS | Performed by: THORACIC SURGERY (CARDIOTHORACIC VASCULAR SURGERY)

## 2023-02-16 PROCEDURE — 25000003 PHARM REV CODE 250: Performed by: PHYSICIAN ASSISTANT

## 2023-02-16 PROCEDURE — 94761 N-INVAS EAR/PLS OXIMETRY MLT: CPT

## 2023-02-16 PROCEDURE — 33405 REPLACEMENT AORTIC VALVE OPN: CPT | Mod: ,,, | Performed by: THORACIC SURGERY (CARDIOTHORACIC VASCULAR SURGERY)

## 2023-02-16 PROCEDURE — 85610 PROTHROMBIN TIME: CPT | Performed by: THORACIC SURGERY (CARDIOTHORACIC VASCULAR SURGERY)

## 2023-02-16 PROCEDURE — 37000008 HC ANESTHESIA 1ST 15 MINUTES: Performed by: THORACIC SURGERY (CARDIOTHORACIC VASCULAR SURGERY)

## 2023-02-16 PROCEDURE — 27000221 HC OXYGEN, UP TO 24 HOURS

## 2023-02-16 PROCEDURE — 37000009 HC ANESTHESIA EA ADD 15 MINS: Performed by: THORACIC SURGERY (CARDIOTHORACIC VASCULAR SURGERY)

## 2023-02-16 PROCEDURE — 63600175 PHARM REV CODE 636 W HCPCS

## 2023-02-16 PROCEDURE — P9016 RBC LEUKOCYTES REDUCED: HCPCS | Performed by: THORACIC SURGERY (CARDIOTHORACIC VASCULAR SURGERY)

## 2023-02-16 PROCEDURE — P9012 CRYOPRECIPITATE EACH UNIT: HCPCS | Performed by: THORACIC SURGERY (CARDIOTHORACIC VASCULAR SURGERY)

## 2023-02-16 PROCEDURE — 36000712 HC OR TIME LEV V 1ST 15 MIN: Performed by: THORACIC SURGERY (CARDIOTHORACIC VASCULAR SURGERY)

## 2023-02-16 PROCEDURE — 80048 BASIC METABOLIC PNL TOTAL CA: CPT | Performed by: PHYSICIAN ASSISTANT

## 2023-02-16 PROCEDURE — P9017 PLASMA 1 DONOR FRZ W/IN 8 HR: HCPCS | Performed by: THORACIC SURGERY (CARDIOTHORACIC VASCULAR SURGERY)

## 2023-02-16 PROCEDURE — 33533 PR CABG, ARTERIAL, SINGLE: ICD-10-PCS | Mod: 51,AS,, | Performed by: PHYSICIAN ASSISTANT

## 2023-02-16 PROCEDURE — C1713 ANCHOR/SCREW BN/BN,TIS/BN: HCPCS | Performed by: THORACIC SURGERY (CARDIOTHORACIC VASCULAR SURGERY)

## 2023-02-16 PROCEDURE — 84132 ASSAY OF SERUM POTASSIUM: CPT | Performed by: THORACIC SURGERY (CARDIOTHORACIC VASCULAR SURGERY)

## 2023-02-16 PROCEDURE — 82962 GLUCOSE BLOOD TEST: CPT | Performed by: THORACIC SURGERY (CARDIOTHORACIC VASCULAR SURGERY)

## 2023-02-16 PROCEDURE — 37799 UNLISTED PX VASCULAR SURGERY: CPT

## 2023-02-16 PROCEDURE — 33533 PR CABG, ARTERIAL, SINGLE: ICD-10-PCS | Mod: 51,,, | Performed by: THORACIC SURGERY (CARDIOTHORACIC VASCULAR SURGERY)

## 2023-02-16 PROCEDURE — 63600175 PHARM REV CODE 636 W HCPCS: Performed by: PHYSICIAN ASSISTANT

## 2023-02-16 PROCEDURE — S5010 5% DEXTROSE AND 0.45% SALINE: HCPCS | Performed by: PHYSICIAN ASSISTANT

## 2023-02-16 PROCEDURE — 85025 COMPLETE CBC W/AUTO DIFF WBC: CPT | Performed by: PHYSICIAN ASSISTANT

## 2023-02-16 PROCEDURE — 25000003 PHARM REV CODE 250

## 2023-02-16 PROCEDURE — 63600175 PHARM REV CODE 636 W HCPCS: Performed by: THORACIC SURGERY (CARDIOTHORACIC VASCULAR SURGERY)

## 2023-02-16 PROCEDURE — 20000000 HC ICU ROOM

## 2023-02-16 PROCEDURE — 33405 PR REPLACE AORT VALV,PROSTH VALV: ICD-10-PCS | Mod: ,,, | Performed by: THORACIC SURGERY (CARDIOTHORACIC VASCULAR SURGERY)

## 2023-02-16 PROCEDURE — 94002 VENT MGMT INPAT INIT DAY: CPT

## 2023-02-16 PROCEDURE — 36620 INSERTION CATHETER ARTERY: CPT | Performed by: ANESTHESIOLOGY

## 2023-02-16 PROCEDURE — 33533 CABG ARTERIAL SINGLE: CPT | Mod: 51,AS,, | Performed by: PHYSICIAN ASSISTANT

## 2023-02-16 PROCEDURE — 85730 THROMBOPLASTIN TIME PARTIAL: CPT | Performed by: PHYSICIAN ASSISTANT

## 2023-02-16 PROCEDURE — 85610 PROTHROMBIN TIME: CPT | Performed by: PHYSICIAN ASSISTANT

## 2023-02-16 PROCEDURE — 63600175 PHARM REV CODE 636 W HCPCS: Mod: JG

## 2023-02-16 PROCEDURE — 33405 PR REPLACE AORT VALV,PROSTH VALV: ICD-10-PCS | Mod: AS,,, | Performed by: PHYSICIAN ASSISTANT

## 2023-02-16 PROCEDURE — C9248 INJ, CLEVIDIPINE BUTYRATE: HCPCS | Mod: JG

## 2023-02-16 PROCEDURE — 36000713 HC OR TIME LEV V EA ADD 15 MIN: Performed by: THORACIC SURGERY (CARDIOTHORACIC VASCULAR SURGERY)

## 2023-02-16 PROCEDURE — 99900035 HC TECH TIME PER 15 MIN (STAT)

## 2023-02-16 PROCEDURE — C1894 INTRO/SHEATH, NON-LASER: HCPCS | Performed by: THORACIC SURGERY (CARDIOTHORACIC VASCULAR SURGERY)

## 2023-02-16 PROCEDURE — 85025 COMPLETE CBC W/AUTO DIFF WBC: CPT | Performed by: THORACIC SURGERY (CARDIOTHORACIC VASCULAR SURGERY)

## 2023-02-16 PROCEDURE — 33405 REPLACEMENT AORTIC VALVE OPN: CPT | Mod: AS,,, | Performed by: PHYSICIAN ASSISTANT

## 2023-02-16 PROCEDURE — 82803 BLOOD GASES ANY COMBINATION: CPT

## 2023-02-16 PROCEDURE — 27800903 OPTIME MED/SURG SUP & DEVICES OTHER IMPLANTS: Performed by: THORACIC SURGERY (CARDIOTHORACIC VASCULAR SURGERY)

## 2023-02-16 PROCEDURE — C1729 CATH, DRAINAGE: HCPCS | Performed by: THORACIC SURGERY (CARDIOTHORACIC VASCULAR SURGERY)

## 2023-02-16 PROCEDURE — 80048 BASIC METABOLIC PNL TOTAL CA: CPT | Performed by: THORACIC SURGERY (CARDIOTHORACIC VASCULAR SURGERY)

## 2023-02-16 PROCEDURE — C1768 GRAFT, VASCULAR: HCPCS | Performed by: THORACIC SURGERY (CARDIOTHORACIC VASCULAR SURGERY)

## 2023-02-16 PROCEDURE — 33533 CABG ARTERIAL SINGLE: CPT | Mod: 51,,, | Performed by: THORACIC SURGERY (CARDIOTHORACIC VASCULAR SURGERY)

## 2023-02-16 DEVICE — SCREW SD LOCKING 2.3X16MM: Type: IMPLANTABLE DEVICE | Site: HEART | Status: FUNCTIONAL

## 2023-02-16 DEVICE — PLATE LOW PROFILE X-PLATE: Type: IMPLANTABLE DEVICE | Site: HEART | Status: FUNCTIONAL

## 2023-02-16 DEVICE — SCREW SD LOCKING 2.3X14MM: Type: IMPLANTABLE DEVICE | Site: HEART | Status: FUNCTIONAL

## 2023-02-16 DEVICE — VALVE AVALUS AORTIC HEART 23MM: Type: IMPLANTABLE DEVICE | Site: HEART | Status: FUNCTIONAL

## 2023-02-16 DEVICE — PLATE MANUBRIUM LOW PROFILE: Type: IMPLANTABLE DEVICE | Site: HEART | Status: FUNCTIONAL

## 2023-02-16 DEVICE — SCREW SD LOCKING 2.3X12MM: Type: IMPLANTABLE DEVICE | Site: HEART | Status: FUNCTIONAL

## 2023-02-16 RX ORDER — DEXTROSE MONOHYDRATE AND SODIUM CHLORIDE 5; .45 G/100ML; G/100ML
INJECTION, SOLUTION INTRAVENOUS CONTINUOUS
Status: DISCONTINUED | OUTPATIENT
Start: 2023-02-16 | End: 2023-02-20 | Stop reason: HOSPADM

## 2023-02-16 RX ORDER — ACETAMINOPHEN 650 MG/20.3ML
650 LIQUID ORAL EVERY 6 HOURS PRN
Status: DISCONTINUED | OUTPATIENT
Start: 2023-02-16 | End: 2023-02-20 | Stop reason: HOSPADM

## 2023-02-16 RX ORDER — DEXMEDETOMIDINE HYDROCHLORIDE 4 UG/ML
0-1.4 INJECTION, SOLUTION INTRAVENOUS CONTINUOUS
Status: DISCONTINUED | OUTPATIENT
Start: 2023-02-16 | End: 2023-02-19

## 2023-02-16 RX ORDER — MAGNESIUM SULFATE HEPTAHYDRATE 40 MG/ML
2 INJECTION, SOLUTION INTRAVENOUS
Status: DISCONTINUED | OUTPATIENT
Start: 2023-02-16 | End: 2023-02-20 | Stop reason: HOSPADM

## 2023-02-16 RX ORDER — VASOPRESSIN 20 [USP'U]/ML
INJECTION, SOLUTION INTRAMUSCULAR; SUBCUTANEOUS
Status: DISCONTINUED | OUTPATIENT
Start: 2023-02-16 | End: 2023-02-16

## 2023-02-16 RX ORDER — ROCURONIUM BROMIDE 10 MG/ML
INJECTION, SOLUTION INTRAVENOUS
Status: DISCONTINUED | OUTPATIENT
Start: 2023-02-16 | End: 2023-02-16

## 2023-02-16 RX ORDER — LOPERAMIDE HYDROCHLORIDE 2 MG/1
2 CAPSULE ORAL CONTINUOUS PRN
Status: DISCONTINUED | OUTPATIENT
Start: 2023-02-16 | End: 2023-02-20 | Stop reason: HOSPADM

## 2023-02-16 RX ORDER — FAMOTIDINE 10 MG/ML
20 INJECTION INTRAVENOUS DAILY
Status: DISCONTINUED | OUTPATIENT
Start: 2023-02-17 | End: 2023-02-20 | Stop reason: HOSPADM

## 2023-02-16 RX ORDER — ASPIRIN 81 MG/1
81 TABLET ORAL DAILY
Status: DISCONTINUED | OUTPATIENT
Start: 2023-02-17 | End: 2023-02-20 | Stop reason: HOSPADM

## 2023-02-16 RX ORDER — DESMOPRESSIN ACETATE 4 UG/ML
INJECTION, SOLUTION INTRAVENOUS; SUBCUTANEOUS
Status: DISCONTINUED | OUTPATIENT
Start: 2023-02-16 | End: 2023-02-16

## 2023-02-16 RX ORDER — POTASSIUM CHLORIDE 14.9 MG/ML
60 INJECTION INTRAVENOUS
Status: DISCONTINUED | OUTPATIENT
Start: 2023-02-16 | End: 2023-02-20 | Stop reason: HOSPADM

## 2023-02-16 RX ORDER — HYDROCODONE BITARTRATE AND ACETAMINOPHEN 500; 5 MG/1; MG/1
TABLET ORAL
Status: DISCONTINUED | OUTPATIENT
Start: 2023-02-16 | End: 2023-02-20 | Stop reason: HOSPADM

## 2023-02-16 RX ORDER — MUPIROCIN 20 MG/G
OINTMENT TOPICAL 2 TIMES DAILY
Status: DISCONTINUED | OUTPATIENT
Start: 2023-02-16 | End: 2023-02-20 | Stop reason: HOSPADM

## 2023-02-16 RX ORDER — SODIUM CHLORIDE, SODIUM GLUCONATE, SODIUM ACETATE, POTASSIUM CHLORIDE AND MAGNESIUM CHLORIDE 30; 37; 368; 526; 502 MG/100ML; MG/100ML; MG/100ML; MG/100ML; MG/100ML
INJECTION, SOLUTION INTRAVENOUS CONTINUOUS
Status: DISCONTINUED | OUTPATIENT
Start: 2023-02-16 | End: 2023-02-20 | Stop reason: HOSPADM

## 2023-02-16 RX ORDER — DEXTROSE MONOHYDRATE 100 MG/ML
25 INJECTION, SOLUTION INTRAVENOUS
Status: DISCONTINUED | OUTPATIENT
Start: 2023-02-16 | End: 2023-02-20 | Stop reason: HOSPADM

## 2023-02-16 RX ORDER — DEXTROSE MONOHYDRATE 100 MG/ML
12.5 INJECTION, SOLUTION INTRAVENOUS
Status: DISCONTINUED | OUTPATIENT
Start: 2023-02-16 | End: 2023-02-20 | Stop reason: HOSPADM

## 2023-02-16 RX ORDER — ONDANSETRON 2 MG/ML
INJECTION INTRAMUSCULAR; INTRAVENOUS
Status: DISCONTINUED | OUTPATIENT
Start: 2023-02-16 | End: 2023-02-16

## 2023-02-16 RX ORDER — PROTAMINE SULFATE 10 MG/ML
INJECTION, SOLUTION INTRAVENOUS
Status: DISCONTINUED | OUTPATIENT
Start: 2023-02-16 | End: 2023-02-16

## 2023-02-16 RX ORDER — ENOXAPARIN SODIUM 100 MG/ML
40 INJECTION SUBCUTANEOUS EVERY 24 HOURS
Status: DISCONTINUED | OUTPATIENT
Start: 2023-02-17 | End: 2023-02-20 | Stop reason: HOSPADM

## 2023-02-16 RX ORDER — MIDAZOLAM HYDROCHLORIDE 1 MG/ML
INJECTION INTRAMUSCULAR; INTRAVENOUS
Status: DISCONTINUED | OUTPATIENT
Start: 2023-02-16 | End: 2023-02-16

## 2023-02-16 RX ORDER — FENTANYL CITRATE 50 UG/ML
INJECTION, SOLUTION INTRAMUSCULAR; INTRAVENOUS
Status: DISCONTINUED | OUTPATIENT
Start: 2023-02-16 | End: 2023-02-16

## 2023-02-16 RX ORDER — MAGNESIUM SULFATE HEPTAHYDRATE 40 MG/ML
4 INJECTION, SOLUTION INTRAVENOUS
Status: DISCONTINUED | OUTPATIENT
Start: 2023-02-16 | End: 2023-02-20 | Stop reason: HOSPADM

## 2023-02-16 RX ORDER — PROPOFOL 10 MG/ML
VIAL (ML) INTRAVENOUS
Status: DISCONTINUED | OUTPATIENT
Start: 2023-02-16 | End: 2023-02-16

## 2023-02-16 RX ORDER — FOLIC ACID 1 MG/1
1 TABLET ORAL DAILY
Status: DISCONTINUED | OUTPATIENT
Start: 2023-02-17 | End: 2023-02-20 | Stop reason: HOSPADM

## 2023-02-16 RX ORDER — METOCLOPRAMIDE HYDROCHLORIDE 5 MG/ML
5 INJECTION INTRAMUSCULAR; INTRAVENOUS EVERY 6 HOURS PRN
Status: DISCONTINUED | OUTPATIENT
Start: 2023-02-16 | End: 2023-02-20 | Stop reason: HOSPADM

## 2023-02-16 RX ORDER — CALCIUM GLUCONATE 20 MG/ML
3 INJECTION, SOLUTION INTRAVENOUS
Status: DISCONTINUED | OUTPATIENT
Start: 2023-02-16 | End: 2023-02-20 | Stop reason: HOSPADM

## 2023-02-16 RX ORDER — HYDROCODONE BITARTRATE AND ACETAMINOPHEN 5; 325 MG/1; MG/1
1 TABLET ORAL EVERY 4 HOURS PRN
Status: DISCONTINUED | OUTPATIENT
Start: 2023-02-16 | End: 2023-02-20 | Stop reason: HOSPADM

## 2023-02-16 RX ORDER — POTASSIUM CHLORIDE 29.8 MG/ML
40 INJECTION INTRAVENOUS
Status: DISCONTINUED | OUTPATIENT
Start: 2023-02-16 | End: 2023-02-20 | Stop reason: HOSPADM

## 2023-02-16 RX ORDER — MORPHINE SULFATE 4 MG/ML
INJECTION, SOLUTION INTRAMUSCULAR; INTRAVENOUS
Status: DISPENSED
Start: 2023-02-16 | End: 2023-02-17

## 2023-02-16 RX ORDER — PAPAVERINE HYDROCHLORIDE 30 MG/ML
INJECTION INTRAMUSCULAR; INTRAVENOUS
Status: DISCONTINUED | OUTPATIENT
Start: 2023-02-16 | End: 2023-02-16 | Stop reason: HOSPADM

## 2023-02-16 RX ORDER — CEFAZOLIN SODIUM 2 G/50ML
2 SOLUTION INTRAVENOUS
Status: COMPLETED | OUTPATIENT
Start: 2023-02-17 | End: 2023-02-17

## 2023-02-16 RX ORDER — PHENYLEPHRINE HYDROCHLORIDE 10 MG/ML
INJECTION INTRAVENOUS
Status: DISCONTINUED | OUTPATIENT
Start: 2023-02-16 | End: 2023-02-16

## 2023-02-16 RX ORDER — CEFAZOLIN SODIUM 2 G/50ML
2 SOLUTION INTRAVENOUS
Status: DISCONTINUED | OUTPATIENT
Start: 2023-02-16 | End: 2023-02-16

## 2023-02-16 RX ORDER — ALBUMIN HUMAN 50 G/1000ML
12.5 SOLUTION INTRAVENOUS
Status: DISCONTINUED | OUTPATIENT
Start: 2023-02-16 | End: 2023-02-20 | Stop reason: HOSPADM

## 2023-02-16 RX ORDER — CALCIUM GLUCONATE 20 MG/ML
2 INJECTION, SOLUTION INTRAVENOUS
Status: DISCONTINUED | OUTPATIENT
Start: 2023-02-16 | End: 2023-02-20 | Stop reason: HOSPADM

## 2023-02-16 RX ORDER — SUCRALFATE 1 G/1
1 TABLET ORAL
Status: DISCONTINUED | OUTPATIENT
Start: 2023-02-17 | End: 2023-02-20 | Stop reason: HOSPADM

## 2023-02-16 RX ORDER — METOPROLOL TARTRATE 25 MG/1
12.5 TABLET ORAL 2 TIMES DAILY
Status: DISCONTINUED | OUTPATIENT
Start: 2023-02-17 | End: 2023-02-19

## 2023-02-16 RX ORDER — NOREPINEPHRINE BITARTRATE 1 MG/ML
INJECTION, SOLUTION INTRAVENOUS
Status: DISCONTINUED | OUTPATIENT
Start: 2023-02-16 | End: 2023-02-16

## 2023-02-16 RX ORDER — MUPIROCIN 20 MG/G
OINTMENT TOPICAL
Status: COMPLETED | OUTPATIENT
Start: 2023-02-16 | End: 2023-02-16

## 2023-02-16 RX ORDER — DOCUSATE SODIUM 100 MG/1
100 CAPSULE, LIQUID FILLED ORAL 2 TIMES DAILY
Status: DISCONTINUED | OUTPATIENT
Start: 2023-02-17 | End: 2023-02-20 | Stop reason: HOSPADM

## 2023-02-16 RX ORDER — EPINEPHRINE 1 MG/ML
INJECTION, SOLUTION, CONCENTRATE INTRAVENOUS
Status: COMPLETED
Start: 2023-02-16 | End: 2023-02-16

## 2023-02-16 RX ORDER — LACTULOSE 10 G/15ML
20 SOLUTION ORAL EVERY 6 HOURS PRN
Status: DISCONTINUED | OUTPATIENT
Start: 2023-02-16 | End: 2023-02-20 | Stop reason: HOSPADM

## 2023-02-16 RX ORDER — TRANEXAMIC ACID 100 MG/ML
INJECTION, SOLUTION INTRAVENOUS
Status: DISCONTINUED | OUTPATIENT
Start: 2023-02-16 | End: 2023-02-16

## 2023-02-16 RX ORDER — MORPHINE SULFATE 4 MG/ML
4 INJECTION, SOLUTION INTRAMUSCULAR; INTRAVENOUS EVERY 4 HOURS PRN
Status: DISCONTINUED | OUTPATIENT
Start: 2023-02-16 | End: 2023-02-20 | Stop reason: HOSPADM

## 2023-02-16 RX ORDER — ONDANSETRON 2 MG/ML
4 INJECTION INTRAMUSCULAR; INTRAVENOUS EVERY 4 HOURS PRN
Status: DISCONTINUED | OUTPATIENT
Start: 2023-02-16 | End: 2023-02-20 | Stop reason: HOSPADM

## 2023-02-16 RX ORDER — HEPARIN SODIUM 1000 [USP'U]/ML
INJECTION, SOLUTION INTRAVENOUS; SUBCUTANEOUS
Status: DISCONTINUED | OUTPATIENT
Start: 2023-02-16 | End: 2023-02-16

## 2023-02-16 RX ORDER — CALCIUM GLUCONATE 20 MG/ML
1 INJECTION, SOLUTION INTRAVENOUS
Status: DISCONTINUED | OUTPATIENT
Start: 2023-02-16 | End: 2023-02-20 | Stop reason: HOSPADM

## 2023-02-16 RX ORDER — CALCIUM CHLORIDE INJECTION 100 MG/ML
INJECTION, SOLUTION INTRAVENOUS
Status: DISCONTINUED | OUTPATIENT
Start: 2023-02-16 | End: 2023-02-16 | Stop reason: HOSPADM

## 2023-02-16 RX ORDER — OXYCODONE HYDROCHLORIDE 5 MG/1
10 TABLET ORAL EVERY 4 HOURS PRN
Status: DISCONTINUED | OUTPATIENT
Start: 2023-02-16 | End: 2023-02-20 | Stop reason: HOSPADM

## 2023-02-16 RX ORDER — POTASSIUM CHLORIDE 14.9 MG/ML
20 INJECTION INTRAVENOUS
Status: DISCONTINUED | OUTPATIENT
Start: 2023-02-16 | End: 2023-02-20 | Stop reason: HOSPADM

## 2023-02-16 RX ADMIN — PHENYLEPHRINE HYDROCHLORIDE 100 MCG: 10 INJECTION INTRAVENOUS at 01:02

## 2023-02-16 RX ADMIN — NOREPINEPHRINE BITARTRATE 16 MCG: 1 INJECTION, SOLUTION, CONCENTRATE INTRAVENOUS at 01:02

## 2023-02-16 RX ADMIN — HEPARIN SODIUM 25000 UNITS: 1000 INJECTION, SOLUTION INTRAVENOUS; SUBCUTANEOUS at 01:02

## 2023-02-16 RX ADMIN — FENTANYL CITRATE 250 MCG: 50 INJECTION, SOLUTION INTRAMUSCULAR; INTRAVENOUS at 04:02

## 2023-02-16 RX ADMIN — ONDANSETRON 500 MG: 2 INJECTION INTRAMUSCULAR; INTRAVENOUS at 03:02

## 2023-02-16 RX ADMIN — FENTANYL CITRATE 100 MCG: 50 INJECTION, SOLUTION INTRAMUSCULAR; INTRAVENOUS at 04:02

## 2023-02-16 RX ADMIN — ROCURONIUM BROMIDE 70 MG: 10 INJECTION, SOLUTION INTRAVENOUS at 12:02

## 2023-02-16 RX ADMIN — TRANEXAMIC ACID 1000 MG: 100 INJECTION, SOLUTION INTRAVENOUS at 12:02

## 2023-02-16 RX ADMIN — PHENYLEPHRINE HYDROCHLORIDE 100 MCG: 10 INJECTION INTRAVENOUS at 12:02

## 2023-02-16 RX ADMIN — DEXMEDETOMIDINE HYDROCHLORIDE 1.4 MCG/KG/HR: 400 INJECTION INTRAVENOUS at 05:02

## 2023-02-16 RX ADMIN — ROCURONIUM BROMIDE 30 MG: 10 INJECTION, SOLUTION INTRAVENOUS at 01:02

## 2023-02-16 RX ADMIN — VASOPRESSIN 2 UNITS: 20 INJECTION INTRAVENOUS at 01:02

## 2023-02-16 RX ADMIN — DEXTROSE MONOHYDRATE 1.5 G: 50 INJECTION, SOLUTION INTRAVENOUS at 12:02

## 2023-02-16 RX ADMIN — MIDAZOLAM HYDROCHLORIDE 2 MG: 1 INJECTION, SOLUTION INTRAMUSCULAR; INTRAVENOUS at 12:02

## 2023-02-16 RX ADMIN — POTASSIUM CHLORIDE 20 MEQ: 14.9 INJECTION, SOLUTION INTRAVENOUS at 07:02

## 2023-02-16 RX ADMIN — CLEVIPIDINE 4 MG/HR: 0.5 EMULSION INTRAVENOUS at 08:02

## 2023-02-16 RX ADMIN — MIDAZOLAM HYDROCHLORIDE 3 MG: 1 INJECTION, SOLUTION INTRAMUSCULAR; INTRAVENOUS at 12:02

## 2023-02-16 RX ADMIN — SODIUM CHLORIDE, SODIUM GLUCONATE, SODIUM ACETATE, POTASSIUM CHLORIDE AND MAGNESIUM CHLORIDE: 526; 502; 368; 37; 30 INJECTION, SOLUTION INTRAVENOUS at 12:02

## 2023-02-16 RX ADMIN — SODIUM CHLORIDE 2 UNITS/HR: 9 INJECTION, SOLUTION INTRAVENOUS at 02:02

## 2023-02-16 RX ADMIN — DESMOPRESSIN ACETATE 16 MCG: 4 INJECTION INTRAVENOUS; SUBCUTANEOUS at 03:02

## 2023-02-16 RX ADMIN — DEXMEDETOMIDINE HYDROCHLORIDE 0.5 MCG/KG/HR: 400 INJECTION INTRAVENOUS at 04:02

## 2023-02-16 RX ADMIN — MUPIROCIN: 20 OINTMENT TOPICAL at 10:02

## 2023-02-16 RX ADMIN — MUPIROCIN: 20 OINTMENT TOPICAL at 08:02

## 2023-02-16 RX ADMIN — EPINEPHRINE 5 MG: 1 INJECTION, SOLUTION, CONCENTRATE INTRAVENOUS at 06:02

## 2023-02-16 RX ADMIN — SUGAMMADEX 200 MG: 100 INJECTION, SOLUTION INTRAVENOUS at 04:02

## 2023-02-16 RX ADMIN — ONDANSETRON 500 MG: 2 INJECTION INTRAMUSCULAR; INTRAVENOUS at 04:02

## 2023-02-16 RX ADMIN — PROTAMINE SULFATE 350 MG: 10 INJECTION, SOLUTION INTRAVENOUS at 03:02

## 2023-02-16 RX ADMIN — FENTANYL CITRATE 250 MCG: 50 INJECTION, SOLUTION INTRAMUSCULAR; INTRAVENOUS at 12:02

## 2023-02-16 RX ADMIN — TRANEXAMIC ACID 1000 MG: 100 INJECTION, SOLUTION INTRAVENOUS at 03:02

## 2023-02-16 RX ADMIN — PROPOFOL 40 MG: 10 INJECTION, EMULSION INTRAVENOUS at 12:02

## 2023-02-16 RX ADMIN — DEXTROSE AND SODIUM CHLORIDE: 5; 450 INJECTION, SOLUTION INTRAVENOUS at 05:02

## 2023-02-16 RX ADMIN — MORPHINE SULFATE 4 MG: 4 INJECTION INTRAVENOUS at 09:02

## 2023-02-16 RX ADMIN — SODIUM CHLORIDE, SODIUM GLUCONATE, SODIUM ACETATE, POTASSIUM CHLORIDE AND MAGNESIUM CHLORIDE: 526; 502; 368; 37; 30 INJECTION, SOLUTION INTRAVENOUS at 07:02

## 2023-02-16 RX ADMIN — MORPHINE SULFATE 4 MG: 4 INJECTION INTRAVENOUS at 05:02

## 2023-02-16 NOTE — ANESTHESIA PROCEDURE NOTES
ALEXIA    Diagnosis: Aortic Stenosis/Coronary artery disease  Patient location during procedure: OR  Exam type: Baseline    Staffing  Performed: anesthesiologist     Anesthesiologist: Nicole Hernandez MD        Anesthesiologist Present  Yes      Setup & Induction  Patient preparation: bite block inserted  Probe Insertion: easy  Exam: completeDoppler Echo: 2D, 3D and color flow mapping.      After induction of general endotracheal anesthesia in the operating room, transesophageal echo probe was placed atraumatically and the esophagus for evaluation of cardiovascular structures.      1. The ascending aorta, transverse arch and descending thoracic aorta to the level of diaphragm are well-visualized.  There is severe and significant atherosclerotic disease in the distal arch and descending thoracic aorta including near circumferential and pedunculated atheromatous in the descending thoracic aorta.  The ascending aorta has a calcification at the sinotubular junction anteriorly but most of the proximal ascending aorta is free of any intraluminal irregularities or calcifications.      2. Right atrium, left atrium, left atrial appendage are well visualized.  There was no thrombus, tumor evidence of an interatrial septal defect.  There is incidentally lipomatous hypertrophy of the interatrial septum.  There is also what appears to be a folded area or a prominent rugae to the posterior wall of the left atrium extending from the Coumadin ridge medially.  This could represent a congenital abnormality of the pulmonary veins.    3. There is trivial tricuspid regurgitation     4. No pulmonic insufficiency is seen     5. Right ventricular size and function is normal.      6. The mitral valve annulus is nondilated, leaflets are freely mobile and morphologically normal.  No stenosis or insufficiency is seen.      7. Difficult to ascertain if this is a tricuspid calcified valve or a bicuspid valve, there is so much calcium and it is very  difficult to delineate.  There is mild insufficiency.  Transvalvular gradients are 48 and 29 maximum and mean respectively.  Calculated aortic valve area is 0.87 cm2 by the continuity equation.  The aortic valve annulus measures 23.4 mm.  The left ventricular outflow tract measures 22 mm.    8. There is marked left ventricular hypertrophy and the left ventricle is very long and narrow.  Anterior wall measures 2.65 cm in thickness.  Severe diastolic dysfunction secondary to relaxation abnormality is seen but overall ejection fraction is 60%.  No obvious focal segmental wall motion abnormalities are seen with the interventricular septum is significantly flattened consistent with long-term hypertension.    9. No pericardial effusions were seen.  No left pleural effusions are identified, right pleural space is not well visualized.  Liver vasculature as well seen and appears normal.    The above findings were discussed with the surgeon prior to surgical incision.

## 2023-02-16 NOTE — BRIEF OP NOTE
[facility]  Cardiothoracic Surgery  Operative Note    SUMMARY     Date of Procedure: 2/16/2023     Procedure: Procedure(s) (LRB):  CORONARY ARTERY BYPASS GRAFT (CABG) (N/A)  Replacement-valve-aortic (N/A)    CABG x 1 LIMA- LAD  AVR 23 mm Medtronic Avalus bioprosthesis     Surgeon(s) and Role:     * Adam Sparks MD - Primary    Assistant: Phil Poe PA-C    Pre-Operative Diagnosis: Moderate to severe aortic stenosis [I35.0]  Coronary artery disease involving native coronary artery of native heart without angina pectoris [I25.10]    Post-Operative Diagnosis: Post-Op Diagnosis Codes:     * Moderate to severe aortic stenosis [I35.0]     * Coronary artery disease involving native coronary artery of native heart without angina pectoris [I25.10]    Anesthesia: General    Operative Findings (including complications, if any):            Specimens:   Specimen (24h ago, onward)       Start     Ordered    02/16/23 1503  Specimen to Pathology  RELEASE UPON ORDERING        References:    Click here for ordering Quick Tip   Question:  Release to patient  Answer:  Immediate    02/16/23 1503                            Condition: Good    Disposition: ICU - intubated and hemodynamically stable.

## 2023-02-16 NOTE — ANESTHESIA PROCEDURE NOTES
Arterial    Diagnosis: AS/CAD    Patient location during procedure: holding area  Procedure start time: 2/16/2023 12:34 PM  Timeout: 2/16/2023 12:34 PM  Procedure end time: 2/16/2023 12:34 PM    Staffing  Authorizing Provider: Nicole Hernandez MD  Performing Provider: Nicole Hernandez MD    Anesthesiologist was present at the time of the procedure.    Preanesthetic Checklist  Completed: patient identified, IV checked, site marked, risks and benefits discussed, surgical consent, monitors and equipment checked, pre-op evaluation, timeout performed and anesthesia consent givenArterial  Skin Prep: chlorhexidine gluconate  Local Infiltration: lidocaine  Orientation: right  Location: radial    Catheter Size: 20 G  Catheter placement by Anatomical landmarks. Heme positive aspiration all ports. Insertion Attempts: 1  Assessment  Dressing: secured with tape and tegaderm  Patient: Tolerated well

## 2023-02-16 NOTE — H&P
[facility] - Periop Services  Pulmonary Critical Care Note    Patient Name: Travis Mckay  MRN: [id number]  Admission Date: 2023  Hospital Length of Stay: 0 days  Code Status: No Order  Attending Provider: Adam Sparks MD  Primary Care Provider: Jerry Robles MD     Subjective:     HPI:   Travis Mckay is a 62 y.o. male with PMH of hypertension, CAD and BPH was referred to Dr. Sparks for evaluation of aortic valve replacement and CABG 2023.  Patient's echocardiogram revealed moderate to severe aortic stenosis with an aortic valve area 0.9 centimeters sq, peak systolic velocity of 3.4 and mean aortic valve gradient of 31 mmHg.  Left heart catheterization revealed 80% stenosis of LAD and diagonal bifurcation.  Patient is being admitted to ICU for close monitoring s/p CABG and aortic valve replacement.    Hospital Course/Significant events:   Current cardiac output 7 cardiac index 4, , chest tubes x1;   Blood products given: 3 U pRBC/568 CS, 2 FFP, 1 platelet, 1 cryo. Urine output 500.    24 Hour Interval History:  Please see above    Past Medical History:   Diagnosis Date    Aortic valve stenosis     Coronary artery disease     Enlarged prostate     Hypertension     SOB (shortness of breath)        Past Surgical History:   Procedure Laterality Date    LEFT HEART CATHETERIZATION      MULTIPLE TOOTH EXTRACTIONS         Social History     Socioeconomic History    Marital status:    Tobacco Use    Smoking status: Former     Packs/day: 0.50     Types: Cigarettes     Quit date: 2023     Years since quittin.1    Smokeless tobacco: Never   Substance and Sexual Activity    Alcohol use: Never    Drug use: Never       Current Outpatient Medications   Medication Instructions    amLODIPine (NORVASC) 5 mg, Oral, Daily    mupirocin (BACTROBAN) 2 % ointment Nasal, Daily, Apply to inside of both nostrils the night before surgery and the morning of surgery    tamsulosin (FLOMAX) 0.4 mg  Cap 2 capsules, Oral, Daily       Current Inpatient Medications   cefUROXime (ZINACEF) IVPB  1.5 g Intravenous Once Pre-Op       Current Intravenous Infusions        ROS    Unable to assess patient was intubated sedated    Objective:       Intake/Output Summary (Last 24 hours) at 2/16/2023 1455  Last data filed at 2/16/2023 1349  Gross per 24 hour   Intake --   Output 150 ml   Net -150 ml       Vital Signs (Most Recent):  Temp: (!) 36.8 °F (2.7 °C) (02/16/23 1032)  Pulse: (!) 56 (02/16/23 1144)  Resp: 18 (02/16/23 1144)  BP: (!) 146/69 (02/16/23 1144)  SpO2: 100 % (02/16/23 1144)    Body mass index is 16.35 kg/m².  Weight: 56.2 kg (123 lb 14.4 oz) Vital Signs (24h Range):  Temp:  [36.8 °F (2.7 °C)] 36.8 °F (2.7 °C)  Pulse:  [56-59] 56  Resp:  [18] 18  SpO2:  [100 %] 100 %  BP: (122-146)/(60-69) 146/69     Physical Exam    General:  no acute respiratory distress  Head: Normocephalic, atraumatic  CVS:  RRR, S1 and S2 normal, no murmurs, no added heart sounds, rubs, gallops, 2+ peripheral pulses  Resp:  Lungs clear to auscultation bilaterally, no wheezes, rales, or rhonci  GI:  Abdomen soft, non-tender, non-distended, normoactive bowel sounds  MSK:  No muscle atrophy, cyanosis, peripheral edema,   Genitourinary:  Enlarged left testicle  Skin:  No rashes, ulcers, erythema  Neuro:  Intubated and sedated        Lines/Drains/Airways       Central Venous Catheter Line  Duration             Percutaneous Central Line Insertion/Assessment - Double Lumen  02/16/23 1202 <1 day    Percutaneous Central Line Insertion/Assessment - Triple Lumen  02/16/23 1343 right internal jugular <1 day              Drain  Duration                  Urethral Catheter 02/16/23 1230 Temperature probe;Silicone 16 Fr. <1 day              Airway  Duration                  Airway - Non-Surgical 02/16/23 1237 <1 day              Arterial Line  Duration             Arterial Line 02/16/23 1234 Right Radial <1 day              Peripheral Intravenous Line   Duration                  Peripheral IV - Single Lumen 02/16/23 1040 18 G Anterior;Right Forearm <1 day                    Significant Labs:    Lab Results   Component Value Date    WBC 7.6 02/13/2023    HGB 10.9 (L) 02/13/2023    HCT 34.1 (L) 02/13/2023    MCV 92.7 02/13/2023     (H) 02/13/2023         BMP  Lab Results   Component Value Date     02/13/2023    K 5.1 02/16/2023    CO2 26 02/13/2023    BUN 13.5 02/13/2023    CREATININE 1.22 (H) 02/13/2023    CALCIUM 9.6 02/13/2023       ABG  No results for input(s): PH, PO2, PCO2, HCO3, BE in the last 168 hours.    Mechanical Ventilation Support:           Significant Imaging:  I have reviewed all pertinent imaging within the past 24 hours.      Assessment/Plan:     Assessment  CAD s/p AVR and CABGx1 using a left internal mammary artery to the LAD   Hypertension  BPH      Plan  -CT Surgery following  -continue CABG protocol  -extubate as tolerated.  -keep SBP less than 140 due to aortic wall being extremely thin  -will closely follow from a critical care aspect       Greater than 30 minutes of critical care was time spent personally by me on the following activities: development of treatment plan with patient or surrogate and bedside caregivers, discussions with consultants, evaluation of patient's response to treatment, examination of patient, ordering and performing treatments and interventions, ordering and review of laboratory studies, ordering and review of radiographic studies, pulse oximetry, re-evaluation of patient's condition.  This critical care time did not overlap with that of any other provider or involve time for any procedures.     Frederic Caicedo MD  Pulmonary Critical Care Medicine  [facility] - Periop Services

## 2023-02-16 NOTE — TRANSFER OF CARE
"Anesthesia Transfer of Care Note    Patient: Travis Mckay    Procedure(s) Performed: Procedure(s) (LRB):  CORONARY ARTERY BYPASS GRAFT (CABG) (N/A)  Replacement-valve-aortic (N/A)    Patient location: ICU    Anesthesia Type: general    Transport from OR: Continuous ECG monitoring in transport. Continuous SpO2 monitoring in transport. Continuos invasive BP monitoring in transport. Transported from OR intubated on 100% O2 by AMBU with adequate controlled ventilation    Post pain: adequate analgesia    Post assessment: no apparent anesthetic complications and tolerated procedure well    Post vital signs: stable    Level of consciousness: sedated    Nausea/Vomiting: no nausea/vomiting    Complications: none    Transfer of care protocol was followed      Last vitals:   Visit Vitals  BP (!) 146/69 (BP Location: Left arm)   Pulse (!) 56   Temp (!) 2.7 °C (36.8 °F) (Oral)   Resp 18   Ht 6' 1" (1.854 m)   Wt 56.2 kg (123 lb 14.4 oz)   SpO2 100%   BMI 16.35 kg/m²     "

## 2023-02-16 NOTE — ANESTHESIA PROCEDURE NOTES
Central Line    Diagnosis: AS/CAD  Patient location during procedure: done in OR  Timeout: 2/16/2023 12:38 PM  Procedure end time: 2/16/2023 12:43 PM    Staffing  Authorizing Provider: Nicole Hernandez MD  Performing Provider: Nicole Hernandez MD    Staffing  Performed: anesthesiologist   Anesthesiologist: Nicole Hernandez MD  Anesthesiologist was present at the time of the procedure.  Preanesthetic Checklist  Completed: patient identified, risks and benefits discussed, monitors and equipment checked, timeout performed and anesthesia consent given  Indication   Indication: hemodynamic monitoring, vascular access, med administration     Anesthesia   general anesthesia    Central Line   Skin Prep: skin prepped with ChloraPrep, skin prep agent completely dried prior to procedure  Sterile Barriers Followed: Yes    All five maximal barriers used- gloves, gown, cap, mask, and large sterile sheet    hand hygiene performed prior to central venous catheter insertion  Location: right internal jugular.   Catheter type: triple lumen  Catheter Size: 9 Fr  Inserted Catheter Length: 16 cm  Ultrasound: none     Manometry: none  Insertion Attempts: 1   Securement:line sutured, chlorhexidine patch, sterile dressing applied and blood return through all ports    Post-Procedure   X-Ray: successful placement   Adverse Events:none      Guidewire Guidewire removed intact.

## 2023-02-16 NOTE — OP NOTE
[facility] - Periop Services  Cardiothoracic Surgery  Operative Note    SUMMARY     Date of Procedure: 2/16/2023     Procedure:  1.  Aortic valve replacement using a 23 mm Medtronic Avalus bioprosthesis  2.  Coronary artery bypass grafting x1 using a left internal mammary artery to the LAD  3. Plating of sternal defect with the [place] sternal plating system     Surgeon: TOMMY Sparks     Assistant: Phil Poe    Pre-Operative Diagnosis:  1.  Severe aortic stenosis   2. Coronary artery disease of native coronary arteries without angina   3.  Shortness of breath   4.  Dyspnea on exertion   5. Chronic obstructive pulmonary disease  6.  Emphysema   7. Protein calorie malnutrition  8.  Severe gingival disease  9.  Status post multiple dental extractions  10.  Uncontrolled hypertension  11.  Severe left ventricular hypertrophy  12.  Diastolic dysfunction    Post-Operative Diagnosis:  Same    Anesthesia: General    Operative Findings (including complications, if any):  Olivier-0 bicuspid aortic valve    Description of Technical Procedures: Patient was delivered to the operating room, support lines were placed, general endotracheal anesthesia was induced.  Patient was prepped and draped in usual sterile fashion.  Saphenous vein was harvested from the lower extremity with the endoscopic saphenous vein harvesting technique.  Wounds were closed in layers with Vicryl.  Median sternotomy was made in the left internal mammary artery was harvested as a pedicle graft.  Heparin was administered, once the ACT was satisfactory the patient was cannulated via the ascending aorta and right atrial appendage and placed on cardiopulmonary bypass.  Aortic cross-clamp was applied and cardioplegia was given down the aortic root to promote diastolic arrest.  Topical cold saline was also used to promote hypothermia.  Aortotomy was made with an 11 blade and extended with Metzenbaum scissors.  CO2 insufflation was used in  the pericardial well throughout the entire case.  Aortic valve was excised sharply and the left ventricle was irrigated with copious amounts of saline irrigation.  Patient was found to have a Olivier-0 bicuspid aortic valve with a rudimentary left coronary cusp.  Distal target coronary arteries were incised in longitudinal fashion conduits were sewn in an end-to-side fashion using Prolene suture.  Interrupted horizontal mattress sutures were then placed in the aortic valve annulus with the pledgets on the inflow side and then through the valve sewing cuff of a 23 mm bioprosthetic valve.  The valve was lowered in the place with the aid of the cor knot device the sutures were tied.  The aortotomy was then closed with 2 layers of 4-0 Prolene in a Owen fashion.  Patient was found to have an ascending aortopathy consistent with bicuspid aortic valve disease and longstanding aortic stenosis.  The heart was appropriately de-aired and the aortic cross-clamp was removed.  The patient was weaned and discontinued from cardiopulmonary bypass.  Right atrial and right ventricular epicardial pacing wires were placed as were 2-24 [place] Juan drains, 1 in the left chest and 1 in the mediastinum.  Protamine was administered the patient was decannulated.  Sternum was closed with stainless steel wire in the [place] sternal plating system.  Subcutaneous tissues were closed in layers with Vicryl.  Patient tolerated the procedure well will be delivered to the recovery room in stable condition.     Estimated Blood Loss (EBL): N/A          Specimens:   Specimen (24h ago, onward)       Start     Ordered    02/16/23 1503  Specimen to Pathology  RELEASE UPON ORDERING        References:    Click here for ordering Quick Tip   Question:  Release to patient  Answer:  Immediate    02/16/23 150                            Condition: Stable    Disposition: ICU - intubated and hemodynamically stable.

## 2023-02-16 NOTE — ANESTHESIA PROCEDURE NOTES
ALEXIA    Diagnosis: Coronary artery disease  Patient location during procedure: OR  Surgery related to: Post-Operative Surgical Evaluation  Exam type: Final    Staffing  Performed: anesthesiologist     Anesthesiologist: Nicole Hernandez MD        Anesthesiologist Present  Yes      Setup & InductionDoppler Echo: color flow mapping, 2D and 3D.        After adequate rewarming, defibrillation x1, and adequate de-airing of the left ventricle, patient was weaned from cardiopulmonary bypass without inotropic support.      1. A bioprosthetic valve is noted in the aortic position.  It appears in good position, functioning normally and no paravalvular leak is noted.  Transvalvular gradient was a mean of 7 mmHg.    2. Again the abnormality in the left atrium is visualized, it does not appear to be a septation per se but may represent some sort of pulmonary venous malformation.  Left upper pulmonary vein showed normal flow.  Right upper pulmonary vein is poorly visualized.      3. Left ventricular function systolic remains normal EF is 55-60% no focal segmental wall motion abnormalities were seen.  There remains significant diastolic dysfunction secondary to marked hypertrophy and relaxation abnormality.      4. No significant mitral regurgitation or systolic anterior motion of the anterior leaflet of the mitral valve is seen.      5. No other changes from the preoperative evaluation were identified.      Heparin was reversed with protamine and the chest was closed with sternal wires.  The exam was once again repeated without interval changes noted and the patient remained hemodynamically stable.  At the conclusion of the operation the transesophageal echo probe was removed atraumatically and the patient brought uneventfully to the intensive care unit for further management.

## 2023-02-16 NOTE — ANESTHESIA PREPROCEDURE EVALUATION
02/16/2023  Travis Mckay is a 62 y.o., male  Echocardiogram reveals moderate to severe aortic stenosis with an aortic valve area calculated to be 0.9 sq cm by continuity equation, peak systolic velocity of 3.4 m/sec, and a mean aortic valve gradient of 31 mmHg.  The patient's DVI is 0.25 and he has normal left ventricular function.  Of note, his pulmonary artery systolic pressure was estimated to be 40 mmHg on echocardiogram.  Left heart catheterization reveals severe, complex LAD/diagonal disease but is otherwise without evidence of flow-limiting disease..      Pre-op Assessment    I have reviewed the Patient Summary Reports.     I have reviewed the Nursing Notes. I have reviewed the NPO Status.   I have reviewed the Medications.     Review of Systems  Anesthesia Hx:  No problems with previous Anesthesia    Social:  Smoker    Cardiovascular:   Hypertension Valvular problems/Murmurs, AS CAD      Pulmonary:   Shortness of breath        Physical Exam    Airway:  Neck ROM: Normal ROM        Anesthesia Plan  Type of Anesthesia, risks & benefits discussed:    Anesthesia Type: Gen ETT  Intra-op Monitoring Plan: Standard ASA Monitors, Art Line, Central Line, ALEXIA and CO  Post Op Pain Control Plan: multimodal analgesia and IV/PO Opioids PRN  Induction:  IV  Airway Plan: Direct, Post-Induction  Informed Consent: Informed consent signed with the Patient and all parties understand the risks and agree with anesthesia plan.  All questions answered. Patient consented to blood products? Yes  ASA Score: 3    Ready For Surgery From Anesthesia Perspective.     .

## 2023-02-16 NOTE — ANESTHESIA PROCEDURE NOTES
Intubation    Date/Time: 2/16/2023 12:37 PM  Performed by: Rosana Esteban CRNA  Authorized by: Nicole Hernandez MD     Intubation:     Induction:  Intravenous    Intubated:  Postinduction    Mask Ventilation:  Easy mask    Attempts:  1    Attempted By:  CRNA    Method of Intubation:  Direct    Blade:  Jesus 3    Laryngeal View Grade: Grade I - full view of cords      Difficult Airway Encountered?: No      Complications:  None    Airway Device:  Oral endotracheal tube    Airway Device Size:  8.0    Style/Cuff Inflation:  Cuffed    Tube secured:  22    Secured at:  The lips    Placement Verified By:  Capnometry    Complicating Factors:  None    Findings Post-Intubation:  BS equal bilateral and atraumatic/condition of teeth unchanged

## 2023-02-17 LAB
ANION GAP SERPL CALC-SCNC: 7 MEQ/L
BASOPHILS # BLD AUTO: 0.06 X10(3)/MCL (ref 0–0.2)
BASOPHILS NFR BLD AUTO: 0.3 %
BUN SERPL-MCNC: 14.8 MG/DL (ref 8.4–25.7)
CALCIUM SERPL-MCNC: 8.3 MG/DL (ref 8.8–10)
CHLORIDE SERPL-SCNC: 112 MMOL/L (ref 98–107)
CO2 SERPL-SCNC: 21 MMOL/L (ref 23–31)
CREAT SERPL-MCNC: 1.02 MG/DL (ref 0.73–1.18)
CREAT/UREA NIT SERPL: 15
EOSINOPHIL # BLD AUTO: 0 X10(3)/MCL (ref 0–0.9)
EOSINOPHIL NFR BLD AUTO: 0 %
ERYTHROCYTE [DISTWIDTH] IN BLOOD BY AUTOMATED COUNT: 16.4 % (ref 11.5–17)
GFR SERPLBLD CREATININE-BSD FMLA CKD-EPI: >60 MLS/MIN/1.73/M2
GLUCOSE SERPL-MCNC: 85 MG/DL (ref 82–115)
HCT VFR BLD AUTO: 30.6 % (ref 42–52)
HGB BLD-MCNC: 10.3 GM/DL (ref 14–18)
IMM GRANULOCYTES # BLD AUTO: 0.15 X10(3)/MCL (ref 0–0.04)
IMM GRANULOCYTES NFR BLD AUTO: 0.9 %
LYMPHOCYTES # BLD AUTO: 0.42 X10(3)/MCL (ref 0.6–4.6)
LYMPHOCYTES NFR BLD AUTO: 2.4 %
MCH RBC QN AUTO: 28.5 PG
MCHC RBC AUTO-ENTMCNC: 33.7 MG/DL (ref 33–36)
MCV RBC AUTO: 84.8 FL (ref 80–94)
MONOCYTES # BLD AUTO: 0.52 X10(3)/MCL (ref 0.1–1.3)
MONOCYTES NFR BLD AUTO: 3 %
NEUTROPHILS # BLD AUTO: 16.03 X10(3)/MCL (ref 2.1–9.2)
NEUTROPHILS NFR BLD AUTO: 93.4 %
NRBC BLD AUTO-RTO: 0 %
PLATELET # BLD AUTO: 270 X10(3)/MCL (ref 130–400)
PMV BLD AUTO: 10.5 FL (ref 7.4–10.4)
POCT GLUCOSE: 101 MG/DL (ref 70–110)
POCT GLUCOSE: 103 MG/DL (ref 70–110)
POCT GLUCOSE: 105 MG/DL (ref 70–110)
POCT GLUCOSE: 123 MG/DL (ref 70–110)
POCT GLUCOSE: 130 MG/DL (ref 70–110)
POCT GLUCOSE: 81 MG/DL (ref 70–110)
POCT GLUCOSE: 82 MG/DL (ref 70–110)
POCT GLUCOSE: 86 MG/DL (ref 70–110)
POCT GLUCOSE: 94 MG/DL (ref 70–110)
POTASSIUM SERPL-SCNC: 4.8 MMOL/L (ref 3.5–5.1)
RBC # BLD AUTO: 3.61 X10(6)/MCL (ref 4.7–6.1)
SODIUM SERPL-SCNC: 140 MMOL/L (ref 136–145)
WBC # SPEC AUTO: 17.2 X10(3)/MCL (ref 4.5–11.5)

## 2023-02-17 PROCEDURE — 99024 PR POST-OP FOLLOW-UP VISIT: ICD-10-PCS | Mod: ,,, | Performed by: PHYSICIAN ASSISTANT

## 2023-02-17 PROCEDURE — 80048 BASIC METABOLIC PNL TOTAL CA: CPT | Performed by: PHYSICIAN ASSISTANT

## 2023-02-17 PROCEDURE — 27000221 HC OXYGEN, UP TO 24 HOURS

## 2023-02-17 PROCEDURE — 94760 N-INVAS EAR/PLS OXIMETRY 1: CPT

## 2023-02-17 PROCEDURE — 21400001 HC TELEMETRY ROOM

## 2023-02-17 PROCEDURE — 99024 POSTOP FOLLOW-UP VISIT: CPT | Mod: ,,, | Performed by: PHYSICIAN ASSISTANT

## 2023-02-17 PROCEDURE — 25000003 PHARM REV CODE 250: Performed by: PHYSICIAN ASSISTANT

## 2023-02-17 PROCEDURE — 63600175 PHARM REV CODE 636 W HCPCS: Performed by: THORACIC SURGERY (CARDIOTHORACIC VASCULAR SURGERY)

## 2023-02-17 PROCEDURE — 97110 THERAPEUTIC EXERCISES: CPT

## 2023-02-17 PROCEDURE — S5010 5% DEXTROSE AND 0.45% SALINE: HCPCS | Performed by: PHYSICIAN ASSISTANT

## 2023-02-17 PROCEDURE — 85025 COMPLETE CBC W/AUTO DIFF WBC: CPT | Performed by: PHYSICIAN ASSISTANT

## 2023-02-17 PROCEDURE — 63600175 PHARM REV CODE 636 W HCPCS: Performed by: PHYSICIAN ASSISTANT

## 2023-02-17 RX ORDER — BISACODYL 10 MG
10 SUPPOSITORY, RECTAL RECTAL DAILY PRN
Status: CANCELLED | OUTPATIENT
Start: 2023-02-17

## 2023-02-17 RX ORDER — POLYETHYLENE GLYCOL 3350 17 G/17G
17 POWDER, FOR SOLUTION ORAL DAILY PRN
Status: CANCELLED | OUTPATIENT
Start: 2023-02-17

## 2023-02-17 RX ORDER — TAMSULOSIN HYDROCHLORIDE 0.4 MG/1
2 CAPSULE ORAL DAILY
Status: DISCONTINUED | OUTPATIENT
Start: 2023-02-17 | End: 2023-02-20 | Stop reason: HOSPADM

## 2023-02-17 RX ORDER — DOCUSATE SODIUM 100 MG/1
200 CAPSULE, LIQUID FILLED ORAL 2 TIMES DAILY
Status: CANCELLED | OUTPATIENT
Start: 2023-02-17

## 2023-02-17 RX ORDER — FUROSEMIDE 20 MG/1
20 TABLET ORAL DAILY
Status: DISCONTINUED | OUTPATIENT
Start: 2023-02-17 | End: 2023-02-20 | Stop reason: HOSPADM

## 2023-02-17 RX ORDER — MUPIROCIN 20 MG/G
OINTMENT TOPICAL 2 TIMES DAILY
Status: CANCELLED | OUTPATIENT
Start: 2023-02-17 | End: 2023-02-22

## 2023-02-17 RX ORDER — ASPIRIN 81 MG/1
81 TABLET ORAL DAILY
Status: CANCELLED | OUTPATIENT
Start: 2023-02-17

## 2023-02-17 RX ADMIN — ENOXAPARIN SODIUM 40 MG: 40 INJECTION SUBCUTANEOUS at 05:02

## 2023-02-17 RX ADMIN — METOPROLOL TARTRATE 12.5 MG: 25 TABLET, FILM COATED ORAL at 08:02

## 2023-02-17 RX ADMIN — ASPIRIN 81 MG: 81 TABLET, COATED ORAL at 09:02

## 2023-02-17 RX ADMIN — DOCUSATE SODIUM 100 MG: 100 CAPSULE, LIQUID FILLED ORAL at 08:02

## 2023-02-17 RX ADMIN — FOLIC ACID 1 MG: 1 TABLET ORAL at 09:02

## 2023-02-17 RX ADMIN — CEFAZOLIN SODIUM 2 G: 2 SOLUTION INTRAVENOUS at 12:02

## 2023-02-17 RX ADMIN — DEXTROSE AND SODIUM CHLORIDE: 5; 450 INJECTION, SOLUTION INTRAVENOUS at 06:02

## 2023-02-17 RX ADMIN — SUCRALFATE 1 G: 1 TABLET ORAL at 06:02

## 2023-02-17 RX ADMIN — OXYCODONE HYDROCHLORIDE 10 MG: 5 TABLET ORAL at 04:02

## 2023-02-17 RX ADMIN — HYDROCODONE BITARTRATE AND ACETAMINOPHEN 1 TABLET: 5; 325 TABLET ORAL at 01:02

## 2023-02-17 RX ADMIN — FUROSEMIDE 20 MG: 20 TABLET ORAL at 09:02

## 2023-02-17 RX ADMIN — MUPIROCIN: 20 OINTMENT TOPICAL at 09:02

## 2023-02-17 RX ADMIN — TAMSULOSIN HYDROCHLORIDE 0.8 MG: 0.4 CAPSULE ORAL at 09:02

## 2023-02-17 RX ADMIN — METOPROLOL TARTRATE 12.5 MG: 25 TABLET, FILM COATED ORAL at 09:02

## 2023-02-17 RX ADMIN — MUPIROCIN: 20 OINTMENT TOPICAL at 08:02

## 2023-02-17 RX ADMIN — DOCUSATE SODIUM 100 MG: 100 CAPSULE, LIQUID FILLED ORAL at 09:02

## 2023-02-17 RX ADMIN — FAMOTIDINE 20 MG: 10 INJECTION, SOLUTION INTRAVENOUS at 09:02

## 2023-02-17 NOTE — PROGRESS NOTES
[facility] - Periop Services  Pulmonary Critical Care Note    Patient Name: Travis Mckay  MRN: [id number]  Admission Date: 2/16/2023  Hospital Length of Stay: 1 days  Code Status: Full Code  Attending Provider: Adam Sparks MD  Primary Care Provider: Jerry Robles MD     Subjective:     HPI:   Travis Mckay is a 62 y.o. male with PMH of hypertension, CAD and BPH was referred to Dr. Sparks for evaluation of aortic valve replacement and CABG 01/30/2023.  Patient's echocardiogram revealed moderate to severe aortic stenosis with an aortic valve area 0.9 centimeters sq, peak systolic velocity of 3.4 and mean aortic valve gradient of 31 mmHg.  Left heart catheterization revealed 80% stenosis of LAD and diagonal bifurcation.  Patient is being admitted to ICU for close monitoring s/p CABG and aortic valve replacement.      Hospital Course/Significant events:   Current cardiac output 7 cardiac index 4, , chest tubes x1;   Blood products given: 3 U pRBC/568 CS, 2 FFP, 1 platelet, 1 cryo. Urine output 500.      24 Hour Interval History:  Extubated yesterday, saturating well on room air this AM. Off all vasoactive medications, states his pain is well controlled.       Past Medical History:   Diagnosis Date    Aortic valve stenosis     Coronary artery disease     Enlarged prostate     Hypertension     SOB (shortness of breath)        Past Surgical History:   Procedure Laterality Date    AORTIC VALVE REPLACEMENT N/A 2/16/2023    Procedure: Replacement-valve-aortic;  Surgeon: Adam Sparks MD;  Location: [facility];  Service: Cardiovascular;  Laterality: N/A;    CORONARY ARTERY BYPASS GRAFT (CABG) N/A 2/16/2023    Procedure: CORONARY ARTERY BYPASS GRAFT (CABG);  Surgeon: Adam Sparks MD;  Location: [facility];  Service: Cardiovascular;  Laterality: N/A;  REQ NOON PER LUIS ARMANDO //   ECHO NOTIFIED    LEFT HEART CATHETERIZATION      MULTIPLE TOOTH EXTRACTIONS         Social History     Socioeconomic History    Marital  status:    Tobacco Use    Smoking status: Former     Packs/day: 0.50     Types: Cigarettes     Quit date: 2023     Years since quittin.1    Smokeless tobacco: Never   Substance and Sexual Activity    Alcohol use: Never    Drug use: Never       Current Outpatient Medications   Medication Instructions    amLODIPine (NORVASC) 5 mg, Oral, Daily    mupirocin (BACTROBAN) 2 % ointment Nasal, Daily, Apply to inside of both nostrils the night before surgery and the morning of surgery    tamsulosin (FLOMAX) 0.4 mg Cap 2 capsules, Oral, Daily       Current Inpatient Medications   aspirin  81 mg Oral Daily    ceFAZolin (ANCEF) IVPB  2 g Intravenous Q12H    docusate sodium  100 mg Oral BID    enoxaparin  40 mg Subcutaneous Daily    famotidine (PF)  20 mg Intravenous Daily    folic acid  1 mg Oral Daily    furosemide  20 mg Oral Daily    metoprolol tartrate  12.5 mg Oral BID    mupirocin   Nasal BID    sucralfate  1 g Oral QID (AC & HS)    tamsulosin  2 capsule Oral Daily       Current Intravenous Infusions   clevidipine Stopped (23)    dexmedeTOMIDine (Precedex) infusion (titrating) Stopped (23)    dextrose 5 % and 0.45 % NaCl 100 mL/hr at 23 06    electrolyte-A 50 mL/hr at 23 06    EPINEPHrine      insulin regular 1 units/mL infusion orderable (CTS POST-OP) Stopped (23)    loperamide           14 point ROS negative unless documented in the history of present illness.         Objective:       Intake/Output Summary (Last 24 hours) at 2023 0859  Last data filed at 2023 06  Gross per 24 hour   Intake 5392.48 ml   Output 3020 ml   Net 2372.48 ml         Vital Signs (Most Recent):  Temp: 99 °F (37.2 °C) (23 0500)  Pulse: 89 (23 06)  Resp: 20 (23)  BP: 119/79 (23)  SpO2: (!) 94 % (23)    Body mass index is 18.09 kg/m².  Weight: 62.2 kg (137 lb 2 oz) Vital Signs (24h Range):  Temp:  [36.8 °F (2.7 °C)-99.5 °F (37.5  °C)] 99 °F (37.2 °C)  Pulse:  [56-90] 89  Resp:  [10-28] 20  SpO2:  [79 %-100 %] 94 %  BP: ()/(58-79) 119/79  Arterial Line BP: ()/(34-69) 129/61       Physical Exam  General:  A/O, NAD   CV: RRR, HD stable on no vasopressor support   Chest: small bloody drainage on surgical site dressing at inferior and superior margins; chest tube exam with small air leak noted on cough   Resp: CTAB, normal work of breathing, saturations 96% on room air   MSK: WWP, thin extremities, no LE edema   Neuro: A/Ox3, ERIN, no gross deficits   Psych: appropriate mood and affect       Lines/Drains/Airways       Central Venous Catheter Line  Duration             Percutaneous Central Line Insertion/Assessment - Double Lumen  02/16/23 1202 <1 day    Percutaneous Central Line Insertion/Assessment - Triple Lumen  02/16/23 1343 right internal jugular <1 day              Drain  Duration                  Chest Tube 02/16/23 1650 Tube - 1 Anterior Mediastinal <1 day         Urethral Catheter 02/16/23 1230 Temperature probe;Silicone 16 Fr. <1 day              Arterial Line  Duration             Arterial Line 02/16/23 1234 Right Radial <1 day              Peripheral Intravenous Line  Duration                  Peripheral IV - Single Lumen 02/16/23 1040 18 G Anterior;Right Forearm <1 day                    Significant Labs:    Lab Results   Component Value Date    WBC 17.2 (H) 02/17/2023    HGB 10.3 (L) 02/17/2023    HCT 30.6 (L) 02/17/2023    MCV 84.8 02/17/2023     02/17/2023         BMP  Lab Results   Component Value Date     02/17/2023    K 4.8 02/17/2023    CO2 21 (L) 02/17/2023    BUN 14.8 02/17/2023    CREATININE 1.02 02/17/2023    CALCIUM 8.3 (L) 02/17/2023         Significant Imaging:  CXR 02/17/2023 personally reviewed: no focal infiltrates or PTX       Assessment/Plan:     Assessment  CAD s/p 1v CABG (LIMA-->LAD) 02/16/2023  Severe aortic stenosis s/p bioprosthetic AVR 02/16/2023    Hypertension  BPH      Plan  -extubated following ICU arrival and now off all vasoactive medications and saturating well on RA   -AM CXR with no focal infiltrates or PTX, very small air leak noted with cough   -postoperative hgb and renal function stable   -OK for downgrade to floor  -remainder of postoperative care per CV surgery recommendations              Sid Sanchez MD  Pulmonary Critical Care Medicine  [facility] - Periop Services

## 2023-02-17 NOTE — PROGRESS NOTES
"Inpatient Nutrition Evaluation    Admit Date: 2/16/2023   Total duration of encounter: 1 day    Nutrition Recommendation/Prescription     Advance diet when appropriate. Goal diet: cardiac, easy to chew.  Add Boost Plus (provides 360 kcal, 14 g protein per serving) chocolate TID.     Nutrition Assessment     Chart Review    Reason Seen: continuous nutrition monitoring and patient/family request  Diagnosis:  CAD s/p 1v CABG  Severe aortic stenosis s/p bioprosthetic AVR   Hypertension  BPH    Relevant Medical History: HTN, CAD, BPH    Nutrition-Related Medications: D5-1/2NS @ 100ml/hr, docusate, folic acid, furosemide    Nutrition-Related Labs:  2/17 Cl 112    Diet Order: Diet Clear liquid (no sugar)/Bariatric  Oral Supplement Order: none  Appetite/Oral Intake: good/% of meals  Factors Affecting Nutritional Intake: chewing difficulty  Food/Mormonism/Cultural Preferences: none reported  Food Allergies: none reported    Skin Integrity: incision  Wound(s):   incision noted    Comments    2/17/23: No wt or appetite changes PTA. Pt with little to no teeth. Ok with ONS added on tray. Noted on clear liquid diet, ok for diet advancement per RN, on clear liquids per pt request.    Anthropometrics    Height: 6' 1" (185.4 cm) Height Method: Stated  Last Weight: 62.2 kg (137 lb 2 oz) (02/17/23 0600) Weight Method: Stated  BMI (Calculated): 18.1  BMI Classification: underweight (BMI less than 18.5)        Ideal Body Weight (IBW), Male: 184 lb     % Ideal Body Weight, Male (lb): 67.34 %                          Usual Weight Provided By: not applicable    Wt Readings from Last 3 Encounters:   02/17/23 0600 62.2 kg (137 lb 2 oz)   02/16/23 1004 56.2 kg (123 lb 14.4 oz)   02/09/23 1352 62.6 kg (138 lb)   01/30/23 0808 62.6 kg (138 lb)   12/12/22 0829 61.7 kg (136 lb)      Weight Change(s) Since Admission:  Admit Weight: 62.6 kg (138 lb) (02/09/23 1352)    Patient Education    Not applicable.    Monitoring & Evaluation "     Dietitian will monitor energy intake.  Nutrition Risk/Follow-Up: low (follow-up in 5-7 days)  Patients assigned 'low nutrition risk' status do not qualify for a full nutritional assessment but will be monitored and re-evaluated in a 5-7 day time period. Please consult if re-evaluation needed sooner.

## 2023-02-17 NOTE — PROGRESS NOTES
CT SURGERY PROGRESS NOTE  Travis Mckay  62 y.o.  1960    Patients Procedure: Procedure(s) (LRB):  CORONARY ARTERY BYPASS GRAFT (CABG) (N/A)  Replacement-valve-aortic (N/A)    Subjective  Interval History: POD 1    ROS    Medication List  Infusions   clevidipine Stopped (02/17/23 0100)    dexmedeTOMIDine (Precedex) infusion (titrating) Stopped (02/16/23 2113)    dextrose 5 % and 0.45 % NaCl 100 mL/hr at 02/17/23 0613    electrolyte-A 50 mL/hr at 02/17/23 0600    EPINEPHrine      insulin regular 1 units/mL infusion orderable (CTS POST-OP) Stopped (02/17/23 0015)    loperamide       Scheduled   aspirin  81 mg Oral Daily    ceFAZolin (ANCEF) IVPB  2 g Intravenous Q12H    docusate sodium  100 mg Oral BID    enoxaparin  40 mg Subcutaneous Daily    famotidine (PF)  20 mg Intravenous Daily    folic acid  1 mg Oral Daily    metoprolol tartrate  12.5 mg Oral BID    mupirocin   Nasal BID    sucralfate  1 g Oral QID (AC & HS)       Objective:  Recent Vitals:  Temp:  [36.8 °F (2.7 °C)-99.5 °F (37.5 °C)] 99 °F (37.2 °C)  Pulse:  [56-90] 89  Resp:  [10-28] 20  SpO2:  [79 %-100 %] 94 %  BP: ()/(58-79) 119/79  Arterial Line BP: ()/(34-69) 129/61    Physical Exam     I/O last 24 hrs:  Intake/Output - Last 3 Shifts         02/15 0700  02/16 0659 02/16 0700 02/17 0659 02/17 0700 02/18 0659    P.O.  240     I.V. (mL/kg)  1559.8 (25.1)     Blood  1714     IV Piggyback  1878.7     Total Intake(mL/kg)  5392.5 (86.7)     Urine (mL/kg/hr)  2660     Chest Tube  360     Total Output  3020     Net  +2372.5                    Labs  ABGs:   Recent Labs   Lab 02/16/23  2215   PH 7.39   PCO2 46*   PO2 89   HCO3 27.8   POCSATURATED 97     BMP:   Recent Labs   Lab 02/16/23  1843 02/16/23  2330 02/17/23  0125     --  140   K 3.9   < > 4.8   CO2 21*  --  21*   BUN 14.2  --  14.8   CREATININE 0.98  --  1.02   CALCIUM 8.4*  --  8.3*   MG 2.60  --   --     < > = values in this interval not displayed.     CBC:   Recent Labs   Lab  02/17/23  0126   WBC 17.2*   RBC 3.61*   HGB 10.3*   HCT 30.6*      MCV 84.8   MCH 28.5   MCHC 33.7     CMP:   Recent Labs   Lab 02/17/23  0125   CALCIUM 8.3*      K 4.8   CO2 21*   BUN 14.8   CREATININE 1.02         Imaging:   CXR: X-Ray Chest AP Portable    Result Date: 2/17/2023  No acute pulmonary process identified. Electronically signed by: Pardeep Jensen Date:    02/17/2023 Time:    06:56    X-Ray Chest AP Portable    Result Date: 2/16/2023  Interval sternotomy with support lines as above. Electronically signed by: Dee Forrester Date:    02/16/2023 Time:    17:55         ASSESSMENT/PLAN:    POD 1  Doing well   telem      Case and plan of care discussed with MD Phil Poe PA-C

## 2023-02-17 NOTE — PLAN OF CARE
02/17/23 1210   Discharge Assessment   Assessment Type Discharge Planning Assessment   Confirmed/corrected address, phone number and insurance Yes   Confirmed Demographics Correct on Facesheet   Source of Information patient   When was your last doctors appointment?   (Patient reports 2 weeks ago.)   Communicated TINO with patient/caregiver Yes   Reason For Admission Moderate to severe Aortic Stenosis   People in Home spouse   Do you expect to return to your current living situation? Yes   Do you have help at home or someone to help you manage your care at home? Yes   Who are your caregiver(s) and their phone number(s)? Spouse: Brooks Citizen: [phone]   Prior to hospitilization cognitive status: Unable to Assess   Current cognitive status: Alert/Oriented   Home Accessibility wheelchair accessible   Home Layout Able to live on 1st floor   Equipment Currently Used at Home none   Readmission within 30 days? No   Patient currently being followed by outpatient case management? No   Do you currently have service(s) that help you manage your care at home? No   Do you take prescription medications? Yes   Do you have prescription coverage? Yes   Coverage [facility] generic   Do you have any problems affording any of your prescribed medications? No   Is the patient taking medications as prescribed? yes   Who is going to help you get home at discharge? Spouse   How do you get to doctors appointments? car, drives self   Are you on dialysis? No   Do you take coumadin? No   Discharge Plan A Home with family   Discharge Plan B Home Health   DME Needed Upon Discharge  none   Discharge Plan discussed with: Patient   Discharge Barriers Identified None   OTHER   Name(s) of People in Home Patient resides with his wife.       Patient's PCP is Dr. Jerry Robles in [place], [place].  Alexandra sent clinicals to [facility] via [employer].

## 2023-02-17 NOTE — PROGRESS NOTES
02/17/23 1130   Pre Exercise Vitals   /58   Pulse 83   Supplemental O2? No   SpO2 95 %   During Exercise Vitals   Pulse 89   Supplemental O2? No   SpO2 94 %   Distance Walked 50 feet   Post Exercise Vitals   /79   Pulse 75   Supplemental O2? No   SpO2 92 %   Modality   Modality Walker     Communicated with nurse prior to session; pt sitting up in bedside recliner; minimal assist to stand; sternal precautions maintained; ambulated 50 feet using rolling walker, O2 sat @94% on room air; assisted back to recliner; all monitors and drains reconnected; encouraged increased activity and use of IS

## 2023-02-17 NOTE — ANESTHESIA POSTPROCEDURE EVALUATION
Anesthesia Post Evaluation    Patient: Travis Mckay    Procedure(s) Performed: Procedure(s) (LRB):  CORONARY ARTERY BYPASS GRAFT (CABG) (N/A)  Replacement-valve-aortic (N/A)    Final Anesthesia Type: general (/Regional//MAC)      Patient location during evaluation: PACU  Post-procedure vital signs: reviewed and stable  Pain management: adequate    PONV status: See postop meds for drugs used to control n/v if any.  Anesthetic complications: no      Cardiovascular status: blood pressure returned to baseline  Respiratory status: intubated (@ baseline)  Hydration status: euvolemic        Pt transported to ICU, formal post anesthesia note deferred.    Vitals Value Taken Time   /67 02/17/23 0701   Temp 37.3 °C (99.14 °F) 02/17/23 0535   Pulse 75 02/17/23 0725   Resp 20 02/17/23 0725   SpO2 92 % 02/17/23 0725   Vitals shown include unvalidated device data.      No case tracking events are documented in the log.      Pain/Serina Score: Pain Rating Prior to Med Admin: 7 (2/17/2023  4:09 AM)  Pain Rating Post Med Admin: 3 (2/17/2023  5:09 AM)         Yes

## 2023-02-18 LAB
ANION GAP SERPL CALC-SCNC: 7 MEQ/L
BASOPHILS # BLD AUTO: 0.07 X10(3)/MCL (ref 0–0.2)
BASOPHILS NFR BLD AUTO: 0.4 %
BUN SERPL-MCNC: 17.8 MG/DL (ref 8.4–25.7)
CALCIUM SERPL-MCNC: 8.4 MG/DL (ref 8.8–10)
CHLORIDE SERPL-SCNC: 111 MMOL/L (ref 98–107)
CO2 SERPL-SCNC: 23 MMOL/L (ref 23–31)
CREAT SERPL-MCNC: 1.15 MG/DL (ref 0.73–1.18)
CREAT/UREA NIT SERPL: 15
EOSINOPHIL # BLD AUTO: 0.02 X10(3)/MCL (ref 0–0.9)
EOSINOPHIL NFR BLD AUTO: 0.1 %
ERYTHROCYTE [DISTWIDTH] IN BLOOD BY AUTOMATED COUNT: 16.5 % (ref 11.5–17)
GFR SERPLBLD CREATININE-BSD FMLA CKD-EPI: >60 MLS/MIN/1.73/M2
GLUCOSE SERPL-MCNC: 111 MG/DL (ref 82–115)
HCT VFR BLD AUTO: 31.5 % (ref 42–52)
HGB BLD-MCNC: 10.7 G/DL (ref 14–18)
IMM GRANULOCYTES # BLD AUTO: 0.1 X10(3)/MCL (ref 0–0.04)
IMM GRANULOCYTES NFR BLD AUTO: 0.6 %
LYMPHOCYTES # BLD AUTO: 1.04 X10(3)/MCL (ref 0.6–4.6)
LYMPHOCYTES NFR BLD AUTO: 6.4 %
MCH RBC QN AUTO: 28.5 PG
MCHC RBC AUTO-ENTMCNC: 34 G/DL (ref 33–36)
MCV RBC AUTO: 84 FL (ref 80–94)
MONOCYTES # BLD AUTO: 1.19 X10(3)/MCL (ref 0.1–1.3)
MONOCYTES NFR BLD AUTO: 7.4 %
NEUTROPHILS # BLD AUTO: 13.76 X10(3)/MCL (ref 2.1–9.2)
NEUTROPHILS NFR BLD AUTO: 85.1 %
NRBC BLD AUTO-RTO: 0 %
PLATELET # BLD AUTO: 263 X10(3)/MCL (ref 130–400)
PMV BLD AUTO: 10.5 FL (ref 7.4–10.4)
POCT GLUCOSE: 126 MG/DL (ref 70–110)
POTASSIUM SERPL-SCNC: 4.5 MMOL/L (ref 3.5–5.1)
RBC # BLD AUTO: 3.75 X10(6)/MCL (ref 4.7–6.1)
SODIUM SERPL-SCNC: 141 MMOL/L (ref 136–145)
WBC # SPEC AUTO: 16.2 X10(3)/MCL (ref 4.5–11.5)

## 2023-02-18 PROCEDURE — 20000000 HC ICU ROOM

## 2023-02-18 PROCEDURE — 80048 BASIC METABOLIC PNL TOTAL CA: CPT | Performed by: PHYSICIAN ASSISTANT

## 2023-02-18 PROCEDURE — 85025 COMPLETE CBC W/AUTO DIFF WBC: CPT | Performed by: PHYSICIAN ASSISTANT

## 2023-02-18 PROCEDURE — 94760 N-INVAS EAR/PLS OXIMETRY 1: CPT

## 2023-02-18 PROCEDURE — 25000003 PHARM REV CODE 250: Performed by: PHYSICIAN ASSISTANT

## 2023-02-18 PROCEDURE — 97110 THERAPEUTIC EXERCISES: CPT

## 2023-02-18 PROCEDURE — 21400001 HC TELEMETRY ROOM

## 2023-02-18 RX ADMIN — FAMOTIDINE 20 MG: 10 INJECTION, SOLUTION INTRAVENOUS at 09:02

## 2023-02-18 RX ADMIN — HYDROCODONE BITARTRATE AND ACETAMINOPHEN 1 TABLET: 5; 325 TABLET ORAL at 05:02

## 2023-02-18 RX ADMIN — SUCRALFATE 1 G: 1 TABLET ORAL at 06:02

## 2023-02-18 RX ADMIN — MUPIROCIN: 20 OINTMENT TOPICAL at 09:02

## 2023-02-18 RX ADMIN — SUCRALFATE 1 G: 1 TABLET ORAL at 11:02

## 2023-02-18 RX ADMIN — METOPROLOL TARTRATE 12.5 MG: 25 TABLET, FILM COATED ORAL at 09:02

## 2023-02-18 RX ADMIN — TAMSULOSIN HYDROCHLORIDE 0.8 MG: 0.4 CAPSULE ORAL at 09:02

## 2023-02-18 RX ADMIN — OXYCODONE HYDROCHLORIDE 10 MG: 5 TABLET ORAL at 01:02

## 2023-02-18 RX ADMIN — FOLIC ACID 1 MG: 1 TABLET ORAL at 09:02

## 2023-02-18 RX ADMIN — DOCUSATE SODIUM 100 MG: 100 CAPSULE, LIQUID FILLED ORAL at 09:02

## 2023-02-18 RX ADMIN — ASPIRIN 81 MG: 81 TABLET, COATED ORAL at 09:02

## 2023-02-18 RX ADMIN — SUCRALFATE 1 G: 1 TABLET ORAL at 09:02

## 2023-02-18 RX ADMIN — FUROSEMIDE 20 MG: 20 TABLET ORAL at 09:02

## 2023-02-18 NOTE — PROGRESS NOTES
POD 2  Up in chair, no c/o  Afebrile vss  Chest clear  Heart sinus  Labs ok cxr ok  Ct's still draining  Doing well  Await bed on tele

## 2023-02-18 NOTE — PLAN OF CARE
Problem: Adult Inpatient Plan of Care  Goal: Plan of Care Review  Outcome: Ongoing, Progressing  Flowsheets (Taken 2/18/2023 0621)  Plan of Care Reviewed With: patient  Goal: Patient-Specific Goal (Individualized)  Outcome: Ongoing, Progressing     Problem: Infection  Goal: Absence of Infection Signs and Symptoms  Outcome: Ongoing, Progressing     Problem: Fall Injury Risk  Goal: Absence of Fall and Fall-Related Injury  Outcome: Ongoing, Progressing

## 2023-02-18 NOTE — NURSING
Nurses Note -- 4 Eyes      2/18/2023   12:48 PM      Skin assessed during: Q Shift Change      [x] No Pressure Injuries Present    [x]Prevention Measures Documented      [] Yes- Altered Skin Integrity Present or Discovered   [] LDA Added if Not in Epic (Describe Wound)   [] New Altered Skin Integrity was Present on Admit and Documented in LDA   [] Wound Image Taken    Wound Care Consulted? No    Attending Nurse:  Dori Lorenzo RN     Second RN/Staff Member:  Tad Smith RN

## 2023-02-18 NOTE — PROGRESS NOTES
02/18/23 1000   Pre Exercise Vitals   /89   Pulse 83   Supplemental O2? Yes   O2 Device nasal cannula   O2 Flow (L/min) 2   SpO2 92 %   During Exercise Vitals   Pulse 90   Supplemental O2? Yes   O2 Device nasal cannula   O2 Flow (L/min) 2   SpO2 92 %   Distance Walked 150 feet   Post Exercise Vitals   BP (!) 143/92   Pulse 85   Supplemental O2? Yes   O2 Device nasal cannula   O2 Flow (L/min) 2   SpO2 92 %   Modality   Modality Walker     Communicated with nurse prior to session; standby assist to stand; sternal precautions maintained; ambulated 150 feet, O2 sat @92% on 2L; monitors and drains reconnected; encouraged increased activity and use of IS

## 2023-02-19 LAB
ANION GAP SERPL CALC-SCNC: 10 MEQ/L
BASOPHILS # BLD AUTO: 0.08 X10(3)/MCL (ref 0–0.2)
BASOPHILS NFR BLD AUTO: 0.5 %
BUN SERPL-MCNC: 15 MG/DL (ref 8.4–25.7)
CALCIUM SERPL-MCNC: 8.6 MG/DL (ref 8.8–10)
CHLORIDE SERPL-SCNC: 108 MMOL/L (ref 98–107)
CO2 SERPL-SCNC: 23 MMOL/L (ref 23–31)
CREAT SERPL-MCNC: 0.95 MG/DL (ref 0.73–1.18)
CREAT/UREA NIT SERPL: 16
EOSINOPHIL # BLD AUTO: 0.04 X10(3)/MCL (ref 0–0.9)
EOSINOPHIL NFR BLD AUTO: 0.3 %
ERYTHROCYTE [DISTWIDTH] IN BLOOD BY AUTOMATED COUNT: 16.4 % (ref 11.5–17)
GFR SERPLBLD CREATININE-BSD FMLA CKD-EPI: >60 MLS/MIN/1.73/M2
GLUCOSE SERPL-MCNC: 93 MG/DL (ref 82–115)
HCT VFR BLD AUTO: 32.4 % (ref 42–52)
HGB BLD-MCNC: 10.8 G/DL (ref 14–18)
IMM GRANULOCYTES # BLD AUTO: 0.1 X10(3)/MCL (ref 0–0.04)
IMM GRANULOCYTES NFR BLD AUTO: 0.7 %
LYMPHOCYTES # BLD AUTO: 0.8 X10(3)/MCL (ref 0.6–4.6)
LYMPHOCYTES NFR BLD AUTO: 5.3 %
MCH RBC QN AUTO: 28.6 PG
MCHC RBC AUTO-ENTMCNC: 33.3 G/DL (ref 33–36)
MCV RBC AUTO: 85.9 FL (ref 80–94)
MONOCYTES # BLD AUTO: 1.22 X10(3)/MCL (ref 0.1–1.3)
MONOCYTES NFR BLD AUTO: 8.1 %
NEUTROPHILS # BLD AUTO: 12.74 X10(3)/MCL (ref 2.1–9.2)
NEUTROPHILS NFR BLD AUTO: 85.1 %
NRBC BLD AUTO-RTO: 0 %
PLATELET # BLD AUTO: 252 X10(3)/MCL (ref 130–400)
PMV BLD AUTO: 11 FL (ref 7.4–10.4)
POTASSIUM SERPL-SCNC: 4.3 MMOL/L (ref 3.5–5.1)
RBC # BLD AUTO: 3.77 X10(6)/MCL (ref 4.7–6.1)
SODIUM SERPL-SCNC: 141 MMOL/L (ref 136–145)
WBC # SPEC AUTO: 15 X10(3)/MCL (ref 4.5–11.5)

## 2023-02-19 PROCEDURE — 21400001 HC TELEMETRY ROOM

## 2023-02-19 PROCEDURE — 25000003 PHARM REV CODE 250: Performed by: PHYSICIAN ASSISTANT

## 2023-02-19 PROCEDURE — 93010 ELECTROCARDIOGRAM REPORT: CPT | Mod: ,,, | Performed by: INTERNAL MEDICINE

## 2023-02-19 PROCEDURE — 27000221 HC OXYGEN, UP TO 24 HOURS

## 2023-02-19 PROCEDURE — 85025 COMPLETE CBC W/AUTO DIFF WBC: CPT | Performed by: PHYSICIAN ASSISTANT

## 2023-02-19 PROCEDURE — 63600175 PHARM REV CODE 636 W HCPCS: Performed by: PHYSICIAN ASSISTANT

## 2023-02-19 PROCEDURE — 93005 ELECTROCARDIOGRAM TRACING: CPT

## 2023-02-19 PROCEDURE — 93010 EKG 12-LEAD: ICD-10-PCS | Mod: ,,, | Performed by: INTERNAL MEDICINE

## 2023-02-19 PROCEDURE — 80048 BASIC METABOLIC PNL TOTAL CA: CPT | Performed by: PHYSICIAN ASSISTANT

## 2023-02-19 PROCEDURE — 11000001 HC ACUTE MED/SURG PRIVATE ROOM

## 2023-02-19 PROCEDURE — 25000003 PHARM REV CODE 250: Performed by: THORACIC SURGERY (CARDIOTHORACIC VASCULAR SURGERY)

## 2023-02-19 RX ORDER — METOPROLOL TARTRATE 1 MG/ML
5 INJECTION, SOLUTION INTRAVENOUS EVERY 5 MIN PRN
Status: DISCONTINUED | OUTPATIENT
Start: 2023-02-19 | End: 2023-02-20 | Stop reason: HOSPADM

## 2023-02-19 RX ORDER — METOPROLOL TARTRATE 25 MG/1
25 TABLET, FILM COATED ORAL 2 TIMES DAILY
Status: DISCONTINUED | OUTPATIENT
Start: 2023-02-19 | End: 2023-02-19

## 2023-02-19 RX ORDER — METOPROLOL TARTRATE 25 MG/1
25 TABLET, FILM COATED ORAL 2 TIMES DAILY
Status: DISCONTINUED | OUTPATIENT
Start: 2023-02-19 | End: 2023-02-20 | Stop reason: HOSPADM

## 2023-02-19 RX ADMIN — METOPROLOL TARTRATE 12.5 MG: 25 TABLET, FILM COATED ORAL at 08:02

## 2023-02-19 RX ADMIN — FUROSEMIDE 20 MG: 20 TABLET ORAL at 08:02

## 2023-02-19 RX ADMIN — METOPROLOL TARTRATE 5 MG: 1 INJECTION, SOLUTION INTRAVENOUS at 11:02

## 2023-02-19 RX ADMIN — HYDROCODONE BITARTRATE AND ACETAMINOPHEN 1 TABLET: 5; 325 TABLET ORAL at 05:02

## 2023-02-19 RX ADMIN — MUPIROCIN: 20 OINTMENT TOPICAL at 08:02

## 2023-02-19 RX ADMIN — FOLIC ACID 1 MG: 1 TABLET ORAL at 08:02

## 2023-02-19 RX ADMIN — SUCRALFATE 1 G: 1 TABLET ORAL at 08:02

## 2023-02-19 RX ADMIN — ENOXAPARIN SODIUM 40 MG: 40 INJECTION SUBCUTANEOUS at 05:02

## 2023-02-19 RX ADMIN — ASPIRIN 81 MG: 81 TABLET, COATED ORAL at 08:02

## 2023-02-19 RX ADMIN — TAMSULOSIN HYDROCHLORIDE 0.8 MG: 0.4 CAPSULE ORAL at 08:02

## 2023-02-19 RX ADMIN — DOCUSATE SODIUM 100 MG: 100 CAPSULE, LIQUID FILLED ORAL at 08:02

## 2023-02-19 RX ADMIN — SUCRALFATE 1 G: 1 TABLET ORAL at 06:02

## 2023-02-19 RX ADMIN — FAMOTIDINE 20 MG: 10 INJECTION, SOLUTION INTRAVENOUS at 08:02

## 2023-02-19 RX ADMIN — HYDROCODONE BITARTRATE AND ACETAMINOPHEN 1 TABLET: 5; 325 TABLET ORAL at 04:02

## 2023-02-19 RX ADMIN — METOPROLOL TARTRATE 25 MG: 25 TABLET, FILM COATED ORAL at 07:02

## 2023-02-19 NOTE — ANESTHESIA POSTPROCEDURE EVALUATION
Anesthesia Post Evaluation    Patient: Travis Mckay    Procedure(s) Performed: Procedure(s) (LRB):  CORONARY ARTERY BYPASS GRAFT (CABG) (N/A)  Replacement-valve-aortic (N/A)    Final Anesthesia Type: general      Patient participation: Yes- Able to Participate  Level of consciousness: awake and alert and oriented  Post-procedure vital signs: reviewed and stable  Pain management: adequate  Airway patency: patent    PONV status at discharge: No PONV  Anesthetic complications: no      Cardiovascular status: hemodynamically stable  Hydration status: euvolemic  Follow-up not needed.          Vitals Value Taken Time   /84 02/19/23 1300   Temp 37 °C (98.6 °F) 02/19/23 1200   Pulse 127 02/19/23 1308   Resp 21 02/19/23 1308   SpO2 68 % 02/19/23 1308   Vitals shown include unvalidated device data.      No case tracking events are documented in the log.      Pain/Serina Score: Pain Rating Prior to Med Admin: 6 (2/19/2023  5:22 AM)  Pain Rating Post Med Admin: 2 (2/19/2023  6:22 AM)

## 2023-02-19 NOTE — NURSING
Nurses Note -- 4 Eyes      2/19/2023   12:44 PM      Skin assessed during: Q Shift Change      [x] No Pressure Injuries Present    [x]Prevention Measures Documented      [] Yes- Altered Skin Integrity Present or Discovered   [] LDA Added if Not in Epic (Describe Wound)   [] New Altered Skin Integrity was Present on Admit and Documented in LDA   [] Wound Image Taken    Wound Care Consulted? No    Attending Nurse:  Dori Lorenzo RN     Second RN/Staff Member:  Juana Jensen RN

## 2023-02-19 NOTE — NURSING
Nurses Note -- 4 Eyes      2/18/2023   10:10 AM      Skin assessed during: Daily Assessment      [x] No Pressure Injuries Present    []Prevention Measures Documented      [] Yes- Altered Skin Integrity Present or Discovered   [] LDA Added if Not in Epic (Describe Wound)   [] New Altered Skin Integrity was Present on Admit and Documented in LDA   [] Wound Image Taken    Wound Care Consulted? No    Attending Nurse:  Enid Hinds RN     Second RN/Staff Member:  Tesfaye Varghese RN

## 2023-02-19 NOTE — PROGRESS NOTES
POD 2  Up in chair, feels good  Afebrile vss  Chest clear  Heart sinus  Labs ok  Cxr ok  D/c ct's  Increase activity  Prob. Home in am

## 2023-02-20 VITALS
RESPIRATION RATE: 16 BRPM | WEIGHT: 131.38 LBS | HEART RATE: 81 BPM | HEIGHT: 73 IN | BODY MASS INDEX: 17.41 KG/M2 | SYSTOLIC BLOOD PRESSURE: 136 MMHG | OXYGEN SATURATION: 92 % | TEMPERATURE: 99 F | DIASTOLIC BLOOD PRESSURE: 83 MMHG

## 2023-02-20 LAB
ABO + RH BLD: NORMAL
ABO + RH BLD: NORMAL
BLD PROD TYP BPU: NORMAL
BLD PROD TYP BPU: NORMAL
BLOOD UNIT EXPIRATION DATE: NORMAL
BLOOD UNIT EXPIRATION DATE: NORMAL
BLOOD UNIT TYPE CODE: 5100
BLOOD UNIT TYPE CODE: 5100
CROSSMATCH INTERPRETATION: NORMAL
CROSSMATCH INTERPRETATION: NORMAL
DISPENSE STATUS: NORMAL
DISPENSE STATUS: NORMAL
PSYCHE PATHOLOGY RESULT: NORMAL
UNIT NUMBER: NORMAL
UNIT NUMBER: NORMAL

## 2023-02-20 PROCEDURE — 99024 POSTOP FOLLOW-UP VISIT: CPT | Mod: ,,, | Performed by: PHYSICIAN ASSISTANT

## 2023-02-20 PROCEDURE — 25000003 PHARM REV CODE 250: Performed by: THORACIC SURGERY (CARDIOTHORACIC VASCULAR SURGERY)

## 2023-02-20 PROCEDURE — 94761 N-INVAS EAR/PLS OXIMETRY MLT: CPT

## 2023-02-20 PROCEDURE — 93010 EKG 12-LEAD: ICD-10-PCS | Mod: ,,, | Performed by: INTERNAL MEDICINE

## 2023-02-20 PROCEDURE — 25000003 PHARM REV CODE 250: Performed by: PHYSICIAN ASSISTANT

## 2023-02-20 PROCEDURE — 99024 PR POST-OP FOLLOW-UP VISIT: ICD-10-PCS | Mod: ,,, | Performed by: PHYSICIAN ASSISTANT

## 2023-02-20 PROCEDURE — 93010 ELECTROCARDIOGRAM REPORT: CPT | Mod: ,,, | Performed by: INTERNAL MEDICINE

## 2023-02-20 PROCEDURE — 27000221 HC OXYGEN, UP TO 24 HOURS

## 2023-02-20 PROCEDURE — 97110 THERAPEUTIC EXERCISES: CPT

## 2023-02-20 PROCEDURE — 93005 ELECTROCARDIOGRAM TRACING: CPT

## 2023-02-20 RX ORDER — METOPROLOL TARTRATE 25 MG/1
25 TABLET, FILM COATED ORAL 2 TIMES DAILY
Qty: 60 TABLET | Refills: 11 | Status: SHIPPED | OUTPATIENT
Start: 2023-02-20 | End: 2024-02-20

## 2023-02-20 RX ORDER — HYDROCODONE BITARTRATE AND ACETAMINOPHEN 5; 325 MG/1; MG/1
1 TABLET ORAL EVERY 4 HOURS PRN
Qty: 40 TABLET | Refills: 0 | Status: SHIPPED | OUTPATIENT
Start: 2023-02-20 | End: 2023-09-25

## 2023-02-20 RX ORDER — ASPIRIN 81 MG/1
81 TABLET ORAL DAILY
Qty: 30 TABLET | Refills: 0 | Status: SHIPPED | OUTPATIENT
Start: 2023-02-21 | End: 2024-02-21

## 2023-02-20 RX ADMIN — METOPROLOL TARTRATE 5 MG: 1 INJECTION, SOLUTION INTRAVENOUS at 12:02

## 2023-02-20 RX ADMIN — DOCUSATE SODIUM 100 MG: 100 CAPSULE, LIQUID FILLED ORAL at 09:02

## 2023-02-20 RX ADMIN — MUPIROCIN: 20 OINTMENT TOPICAL at 09:02

## 2023-02-20 RX ADMIN — FUROSEMIDE 20 MG: 20 TABLET ORAL at 09:02

## 2023-02-20 RX ADMIN — FOLIC ACID 1 MG: 1 TABLET ORAL at 09:02

## 2023-02-20 RX ADMIN — FAMOTIDINE 20 MG: 10 INJECTION, SOLUTION INTRAVENOUS at 09:02

## 2023-02-20 RX ADMIN — SUCRALFATE 1 G: 1 TABLET ORAL at 05:02

## 2023-02-20 RX ADMIN — METOPROLOL TARTRATE 25 MG: 25 TABLET, FILM COATED ORAL at 09:02

## 2023-02-20 RX ADMIN — ASPIRIN 81 MG: 81 TABLET, COATED ORAL at 09:02

## 2023-02-20 RX ADMIN — TAMSULOSIN HYDROCHLORIDE 0.8 MG: 0.4 CAPSULE ORAL at 09:02

## 2023-02-20 NOTE — NURSING
Nurses Note -- 4 Eyes      2/19/2023   10:41 PM      Skin assessed during: Transfer      [x] No Pressure Injuries Present    [x]Prevention Measures Documented      [] Yes- Altered Skin Integrity Present or Discovered   [] LDA Added if Not in Epic (Describe Wound)   [] New Altered Skin Integrity was Present on Admit and Documented in LDA   [] Wound Image Taken    Wound Care Consulted? No    Attending Nurse:  Susana Walker RN     Second RN/Staff Member:  Raymond Steel RN

## 2023-02-20 NOTE — PLAN OF CARE
02/20/23 1022   Post-Acute Status   Post-Acute Authorization Home Health   Home Health Status Set-up Complete/Auth obtained   Coverage [employer]   Discharge Plan   Discharge Plan A Home Health   Discharge Plan B Home Health     [facility] for Home Health

## 2023-02-20 NOTE — NURSING
Patient educated on all discharge information including follow up appointment for PCP, CIS, and [facility]. Patient set up with home health per . Patient's medications delivered to bedside per retail pharmacy. VSS. NAD. No complaints of pain. IV and Tele discontinued. Patient denies any further needs and verbalizes understanding of all discharge information. Patient being transported home in private vehicle via wheelchair with family member.

## 2023-02-20 NOTE — DISCHARGE SUMMARY
[facility] 3rd Floor Medical Telemetry  Cardiothoracic Surgery  Discharge Summary      Patient Name: Travis Mckay  MRN: [id number]  Admission Date: 2/16/2023  Hospital Length of Stay: 4 days  Discharge Date and Time:  02/20/2023 10:06 AM  Attending Physician: Wally Sparks MD   Discharging Provider: VALERIA Arreaga  Primary Care Provider: Jerry Robles MD    HPI:   No notes on file    Procedure(s) (LRB):  CORONARY ARTERY BYPASS GRAFT (CABG) (N/A)  Replacement-valve-aortic (N/A)      Indwelling Lines/Drains at time of discharge:   Lines/Drains/Airways     None               Hospital Course: No notes on file    Goals of Care Treatment Preferences:  Code Status: Full Code      Consults (From admission, onward)        Status Ordering Provider     Inpatient consult to Cardiology  Once        Provider:  Alli Lynch MD    Acknowledged JENA ADLER     Inpatient consult to Social Work/Case Management  Once        Provider:  (Not yet assigned)    Acknowledged WALLY SPARKS     Inpatient consult to Cardiac Rehab  Once        Provider:  (Not yet assigned)    WALLY Sanford          Significant Diagnostic Studies: Labs: All labs within the past 24 hours have been reviewed    Pending Diagnostic Studies:     Procedure Component Value Units Date/Time    MRSA PCR [[id number]]     Order Status: Sent Lab Status: No result     Specimen: Nasal Swab     Specimen to Pathology [[id number]] Collected: 02/16/23 1406    Order Status: Sent Lab Status: In process Updated: 02/17/23 1323    Specimen: Tissue from Heart Valve           No notes have been filed under this hospital service.  Service: Cardiothoracic Surgery    Final Active Diagnoses:    Diagnosis Date Noted POA    PRINCIPAL PROBLEM:  Moderate to severe aortic stenosis [I35.0] 12/12/2022 Yes      Problems Resolved During this Admission:      Discharged Condition: good    Disposition: Home or Self Care    Follow Up:    Patient  Instructions:   No discharge procedures on file.  Medications:  Reconciled Home Medications:      Medication List      START taking these medications    aspirin 81 MG EC tablet  Commonly known as: ECOTRIN  Take 1 tablet (81 mg total) by mouth once daily.  Start taking on: February 21, 2023     HYDROcodone-acetaminophen 5-325 mg per tablet  Commonly known as: NORCO  Take 1 tablet by mouth every 4 (four) hours as needed for Pain.     metoprolol tartrate 25 MG tablet  Commonly known as: LOPRESSOR  Take 1 tablet (25 mg total) by mouth 2 (two) times daily.        CONTINUE taking these medications    amLODIPine 5 MG tablet  Commonly known as: NORVASC  Take 5 mg by mouth once daily.     tamsulosin 0.4 mg Cap  Commonly known as: FLOMAX  Take 2 capsules by mouth once daily.        STOP taking these medications    mupirocin 2 % ointment  Commonly known as: BACTROBAN          Time spent on the discharge of patient: 33 minutes    VALERIA Arreaga  Cardiothoracic Surgery  [facility] - 3rd Floor Medical Telemetry

## 2023-02-20 NOTE — PROGRESS NOTES
02/20/23 0915   Pre Exercise Vitals   /86   Pulse 97  (NSR)   Supplemental O2? No  (weaned off to room air)   SpO2 94 %   During Exercise Vitals   Pulse 99   Supplemental O2? No   SpO2 94 %   Distance Walked 350 ft   Post Exercise Vitals   /88   Pulse 90   Supplemental O2? No   SpO2 93 %   Modality   Modality Independent     Independent from bed to standing and in ambulation; weaned to room air; sternal precautions maintained; encouraged to increase activity and IS; reviewed home care with patient and wife; for discharge today

## 2023-02-20 NOTE — CONSULTS
[facility] - 3rd Floor Medical Telemetry  Cardiology  Consult Note    Patient Name: Travis Mckay  MRN: [id number]  Admission Date: 2/16/2023  Hospital Length of Stay: 4 days  Code Status: Prior   Attending Provider: No att. providers found   Consulting Provider: Alli Lynch MD  Primary Care Physician: Jerry Robles MD  Principal Problem:Moderate to severe aortic stenosis    Patient information was obtained from patient and ER records.     Subjective:     Chief Complaint:  Status post CABG x1 LIMA to LAD, BIO AVR     HPI: Travis Mckay is a 62 y.o. male with PMH of hypertension, CAD and BPH was referred to Dr. Sparks for evaluation of aortic valve replacement and CABG 01/30/2023.  Patient's echocardiogram revealed moderate to severe aortic stenosis with an aortic valve area 0.9 centimeters sq, peak systolic velocity of 3.4 and mean aortic valve gradient of 31 mmHg.  Left heart catheterization revealed 80% stenosis of LAD and diagonal bifurcation.  Patient s/p CABG and aortic valve replacement.    2.20.23: Patient resting in room. Wife at bedside. VSS. NAD. Seen by Dr. Sparks's team and deemed stable for discharge to home. Patient has no concerns. Feels well and ready to go home.         PSH: See below  Family History: Noncontributory  Social History: Former smoker (1/2ppd). Quit one month ago.    Previous Cardiac Diagnostics:   ALEXIA 2.16.23  Not read    Cardiac cath 2.16.23  Cardiac cath 1.30.23    Past Medical History:   Diagnosis Date    Aortic valve stenosis     Coronary artery disease     Enlarged prostate     Hypertension     SOB (shortness of breath)        Past Surgical History:   Procedure Laterality Date    AORTIC VALVE REPLACEMENT N/A 2/16/2023    Procedure: Replacement-valve-aortic;  Surgeon: Adam Sparks MD;  Location: [facility];  Service: Cardiovascular;  Laterality: N/A;    CORONARY ARTERY BYPASS GRAFT (CABG) N/A 2/16/2023    Procedure: CORONARY ARTERY BYPASS GRAFT (CABG);   Surgeon: Adam Sparks MD;  [facility];  Service: Cardiovascular;  Laterality: N/A;  REQ NOON PER LUIS ARMANDO //   ECHO NOTIFIED    LEFT HEART CATHETERIZATION      MULTIPLE TOOTH EXTRACTIONS         Review of patient's allergies indicates:  No Known Allergies    No current facility-administered medications on file prior to encounter.     Current Outpatient Medications on File Prior to Encounter   Medication Sig    amLODIPine (NORVASC) 5 MG tablet Take 5 mg by mouth once daily.    tamsulosin (FLOMAX) 0.4 mg Cap Take 2 capsules by mouth once daily.     Family History    None       Tobacco Use    Smoking status: Former     Packs/day: 0.50     Types: Cigarettes     Quit date: 2023     Years since quittin.1    Smokeless tobacco: Never   Substance and Sexual Activity    Alcohol use: Never    Drug use: Never    Sexual activity: Not on file       Review of Systems   Constitutional:  Negative for chills, fatigue and fever.   Respiratory:  Negative for shortness of breath.    Cardiovascular:  Negative for palpitations and leg swelling.   Neurological:  Negative for light-headedness and headaches.     Objective:     Vital Signs (Most Recent):  Temp: 99.2 °F (37.3 °C) (23 1102)  Pulse: 81 (23 1200)  Resp: 16 (23 1200)  BP: 136/83 (23 1102)  SpO2: (!) 92 % (23 1200)   Vital Signs (24h Range):  Temp:  [98.4 °F (36.9 °C)-99.2 °F (37.3 °C)] 99.2 °F (37.3 °C)  Pulse:  [] 81  Resp:  [16-18] 16  SpO2:  [92 %-98 %] 92 %  BP: ()/(68-83) 136/83     Weight: 59.6 kg (131 lb 6.3 oz)  Body mass index is 17.34 kg/m².    SpO2: (!) 92 %         Intake/Output Summary (Last 24 hours) at 2023 1733  Last data filed at 2023 0618  Gross per 24 hour   Intake 720 ml   Output 300 ml   Net 420 ml       Lines/Drains/Airways       None                   Significant Labs:  Recent Results (from the past 72 hour(s))   POCT glucose    Collection Time: 23  8:10 PM   Result Value Ref Range    POCT  Glucose 130 (H) 70 - 110 mg/dL   Basic metabolic panel    Collection Time: 02/18/23  3:04 AM   Result Value Ref Range    Sodium Level 141 136 - 145 mmol/L    Potassium Level 4.5 3.5 - 5.1 mmol/L    Chloride 111 (H) 98 - 107 mmol/L    Carbon Dioxide 23 23 - 31 mmol/L    Glucose Level 111 82 - 115 mg/dL    Blood Urea Nitrogen 17.8 8.4 - 25.7 mg/dL    Creatinine 1.15 0.73 - 1.18 mg/dL    BUN/Creatinine Ratio 15     Calcium Level Total 8.4 (L) 8.8 - 10.0 mg/dL    Anion Gap 7.0 mEq/L    eGFR >60 mls/min/1.73/m2   CBC with Differential    Collection Time: 02/18/23  3:04 AM   Result Value Ref Range    WBC 16.2 (H) 4.5 - 11.5 x10(3)/mcL    RBC 3.75 (L) 4.70 - 6.10 x10(6)/mcL    Hgb 10.7 (L) 14.0 - 18.0 g/dL    Hct 31.5 (L) 42.0 - 52.0 %    MCV 84.0 80.0 - 94.0 fL    MCH 28.5 pg    MCHC 34.0 33.0 - 36.0 g/dL    RDW 16.5 11.5 - 17.0 %    Platelet 263 130 - 400 x10(3)/mcL    MPV 10.5 (H) 7.4 - 10.4 fL    Neut % 85.1 %    Lymph % 6.4 %    Mono % 7.4 %    Eos % 0.1 %    Basophil % 0.4 %    Lymph # 1.04 0.6 - 4.6 x10(3)/mcL    Neut # 13.76 (H) 2.1 - 9.2 x10(3)/mcL    Mono # 1.19 0.1 - 1.3 x10(3)/mcL    Eos # 0.02 0 - 0.9 x10(3)/mcL    Baso # 0.07 0 - 0.2 x10(3)/mcL    IG# 0.10 (H) 0 - 0.04 x10(3)/mcL    IG% 0.6 %    NRBC% 0.0 %   POCT glucose    Collection Time: 02/18/23  9:09 PM   Result Value Ref Range    POCT Glucose 126 (H) 70 - 110 mg/dL   Basic metabolic panel    Collection Time: 02/19/23  2:03 AM   Result Value Ref Range    Sodium Level 141 136 - 145 mmol/L    Potassium Level 4.3 3.5 - 5.1 mmol/L    Chloride 108 (H) 98 - 107 mmol/L    Carbon Dioxide 23 23 - 31 mmol/L    Glucose Level 93 82 - 115 mg/dL    Blood Urea Nitrogen 15.0 8.4 - 25.7 mg/dL    Creatinine 0.95 0.73 - 1.18 mg/dL    BUN/Creatinine Ratio 16     Calcium Level Total 8.6 (L) 8.8 - 10.0 mg/dL    Anion Gap 10.0 mEq/L    eGFR >60 mls/min/1.73/m2   CBC with Differential    Collection Time: 02/19/23  2:03 AM   Result Value Ref Range    WBC 15.0 (H) 4.5 - 11.5  x10(3)/mcL    RBC 3.77 (L) 4.70 - 6.10 x10(6)/mcL    Hgb 10.8 (L) 14.0 - 18.0 g/dL    Hct 32.4 (L) 42.0 - 52.0 %    MCV 85.9 80.0 - 94.0 fL    MCH 28.6 pg    MCHC 33.3 33.0 - 36.0 g/dL    RDW 16.4 11.5 - 17.0 %    Platelet 252 130 - 400 x10(3)/mcL    MPV 11.0 (H) 7.4 - 10.4 fL    Neut % 85.1 %    Lymph % 5.3 %    Mono % 8.1 %    Eos % 0.3 %    Basophil % 0.5 %    Lymph # 0.80 0.6 - 4.6 x10(3)/mcL    Neut # 12.74 (H) 2.1 - 9.2 x10(3)/mcL    Mono # 1.22 0.1 - 1.3 x10(3)/mcL    Eos # 0.04 0 - 0.9 x10(3)/mcL    Baso # 0.08 0 - 0.2 x10(3)/mcL    IG# 0.10 (H) 0 - 0.04 x10(3)/mcL    IG% 0.7 %    NRBC% 0.0 %       Significant Imaging:      EKG:    Results for orders placed or performed during the hospital encounter of 02/16/23   EKG 12-lead    Narrative    Test Reason : I49.9,    Vent. Rate : 125 BPM     Atrial Rate : 125 BPM     P-R Int : 220 ms          QRS Dur : 108 ms      QT Int : 398 ms       P-R-T Axes : 048 039 057 degrees     QTc Int : 574 ms    Sinus tachycardia with 1st degree A-V block  Minimal voltage criteria for LVH, may be normal variant ( Amari product )  Nonspecific ST and T wave abnormality  Abnormal ECG  When compared with ECG of 13-FEB-2023 10:53,  NE interval has increased  Vent. rate has increased BY  68 BPM  Left anterior fascicular block is no longer Present  Incomplete right bundle branch block is no longer Present  Borderline criteria for Anteroseptal infarct are no longer Present  Confirmed by Kaiden Pelayo MD ([id number]) on 2/19/2023 10:38:47 PM    Referred By: ETHAN WOLFE           Confirmed By:Kaiden Pelayo MD       Telemetry:        Physical Exam  Constitutional:       Appearance: Normal appearance.   Pulmonary:      Effort: Pulmonary effort is normal.   Musculoskeletal:         General: Normal range of motion.   Skin:     General: Skin is warm.   Neurological:      General: No focal deficit present.      Mental Status: He is alert.       Home Medications:   No current facility-administered  medications on file prior to encounter.     Current Outpatient Medications on File Prior to Encounter   Medication Sig Dispense Refill    amLODIPine (NORVASC) 5 MG tablet Take 5 mg by mouth once daily.      tamsulosin (FLOMAX) 0.4 mg Cap Take 2 capsules by mouth once daily.         Current Inpatient Medications:  No current facility-administered medications for this encounter.    Current Outpatient Medications:     amLODIPine (NORVASC) 5 MG tablet, Take 5 mg by mouth once daily., Disp: , Rfl:     tamsulosin (FLOMAX) 0.4 mg Cap, Take 2 capsules by mouth once daily., Disp: , Rfl:     [START ON 2/21/2023] aspirin (ECOTRIN) 81 MG EC tablet, Take 1 tablet (81 mg total) by mouth once daily., Disp: 30 tablet, Rfl: 0    HYDROcodone-acetaminophen (NORCO) 5-325 mg per tablet, Take 1 tablet by mouth every 4 (four) hours as needed for Pain., Disp: 40 tablet, Rfl: 0    metoprolol tartrate (LOPRESSOR) 25 MG tablet, Take 1 tablet (25 mg total) by mouth 2 (two) times daily., Disp: 60 tablet, Rfl: 11         VTE Risk Mitigation (From admission, onward)           Ordered     IP VTE HIGH RISK PATIENT  Once         02/16/23 1620                    Assessment:     CAD s/p CABG x1(LIMA - LAD) 2.16.23 POD#4  Severe aortic stenosis s/p bio AVR 2.16.23  HTN    Plan:   Continue post-CABG care.   Follow-up with Omer Sparks and [facility] outpatient  Advised to remain as active as tolerated.    Patient stable and cleared for discharge from Cardiology standpoint.    Thank you for your consult.     Alli Lynch MD  Cardiology  [facility] - 3rd Floor Medical Telemetry  02/20/2023 8:32 AM

## 2023-02-21 PROCEDURE — G0180 PR HOME HEALTH MD CERTIFICATION: ICD-10-PCS | Mod: ,,, | Performed by: THORACIC SURGERY (CARDIOTHORACIC VASCULAR SURGERY)

## 2023-02-21 PROCEDURE — G0180 MD CERTIFICATION HHA PATIENT: HCPCS | Mod: ,,, | Performed by: THORACIC SURGERY (CARDIOTHORACIC VASCULAR SURGERY)

## 2023-03-04 LAB
ABO + RH BLD: NORMAL
BLD PROD TYP BPU: NORMAL
BLOOD UNIT EXPIRATION DATE: NORMAL
BLOOD UNIT TYPE CODE: 5100
CROSSMATCH INTERPRETATION: NORMAL
DISPENSE STATUS: NORMAL
UNIT NUMBER: NORMAL

## 2023-03-13 ENCOUNTER — OFFICE VISIT (OUTPATIENT)
Dept: CARDIAC SURGERY | Facility: CLINIC | Age: 63
End: 2023-03-13
Payer: COMMERCIAL

## 2023-03-13 VITALS
HEIGHT: 73 IN | HEART RATE: 82 BPM | BODY MASS INDEX: 17.23 KG/M2 | WEIGHT: 130 LBS | SYSTOLIC BLOOD PRESSURE: 138 MMHG | DIASTOLIC BLOOD PRESSURE: 78 MMHG

## 2023-03-13 DIAGNOSIS — Z95.2 S/P AVR (AORTIC VALVE REPLACEMENT): ICD-10-CM

## 2023-03-13 LAB
GLUCOSE SERPL-MCNC: 109 MG/DL (ref 70–110)
GLUCOSE SERPL-MCNC: 124 MG/DL (ref 70–110)
GLUCOSE SERPL-MCNC: 147 MG/DL (ref 70–110)
GLUCOSE SERPL-MCNC: 157 MG/DL (ref 70–110)
GLUCOSE SERPL-MCNC: 158 MG/DL (ref 70–110)
GLUCOSE SERPL-MCNC: 94 MG/DL (ref 70–110)
HCO3 UR-SCNC: 21.2 MMOL/L (ref 24–28)
HCO3 UR-SCNC: 24.5 MMOL/L (ref 24–28)
HCO3 UR-SCNC: 25.9 MMOL/L (ref 24–28)
HCO3 UR-SCNC: 26.1 MMOL/L (ref 24–28)
HCO3 UR-SCNC: 26.1 MMOL/L (ref 24–28)
HCO3 UR-SCNC: 27.1 MMOL/L (ref 24–28)
HCT VFR BLD CALC: 19 %PCV (ref 36–54)
HCT VFR BLD CALC: 21 %PCV (ref 36–54)
HCT VFR BLD CALC: 22 %PCV (ref 36–54)
HCT VFR BLD CALC: 22 %PCV (ref 36–54)
HCT VFR BLD CALC: 23 %PCV (ref 36–54)
HCT VFR BLD CALC: 26 %PCV (ref 36–54)
HGB BLD-MCNC: 7 G/DL
HGB BLD-MCNC: 7 G/DL
HGB BLD-MCNC: 8 G/DL
HGB BLD-MCNC: 9 G/DL
PCO2 BLDA: 33 MMHG (ref 35–45)
PCO2 BLDA: 44.3 MMHG (ref 35–45)
PCO2 BLDA: 44.7 MMHG (ref 35–45)
PCO2 BLDA: 47.4 MMHG (ref 35–45)
PCO2 BLDA: 56.2 MMHG (ref 35–45)
PCO2 BLDA: 57.7 MMHG (ref 35–45)
PH SMN: 7.25 [PH] (ref 7.35–7.45)
PH SMN: 7.28 [PH] (ref 7.35–7.45)
PH SMN: 7.35 [PH] (ref 7.35–7.45)
PH SMN: 7.38 [PH] (ref 7.35–7.45)
PH SMN: 7.38 [PH] (ref 7.35–7.45)
PH SMN: 7.41 [PH] (ref 7.35–7.45)
PO2 BLDA: 323 MMHG (ref 80–100)
PO2 BLDA: 338 MMHG (ref 80–100)
PO2 BLDA: 340 MMHG (ref 80–100)
PO2 BLDA: 44 MMHG (ref 40–60)
PO2 BLDA: 545 MMHG (ref 80–100)
PO2 BLDA: 97 MMHG (ref 80–100)
POC BE: -3 MMOL/L
POC BE: -3 MMOL/L
POC BE: 0 MMOL/L
POC BE: 0 MMOL/L
POC BE: 1 MMOL/L
POC BE: 1 MMOL/L
POC IONIZED CALCIUM: 0.76 MMOL/L (ref 1.06–1.42)
POC IONIZED CALCIUM: 0.92 MMOL/L (ref 1.06–1.42)
POC IONIZED CALCIUM: 0.92 MMOL/L (ref 1.06–1.42)
POC IONIZED CALCIUM: 1.01 MMOL/L (ref 1.06–1.42)
POC IONIZED CALCIUM: 1.09 MMOL/L (ref 1.06–1.42)
POC IONIZED CALCIUM: 1.15 MMOL/L (ref 1.06–1.42)
POC SATURATED O2: 100 % (ref 95–100)
POC SATURATED O2: 78 % (ref 95–100)
POC SATURATED O2: 96 % (ref 95–100)
POC TCO2: 22 MMOL/L (ref 23–27)
POC TCO2: 26 MMOL/L (ref 23–27)
POC TCO2: 27 MMOL/L (ref 23–27)
POC TCO2: 27 MMOL/L (ref 23–27)
POC TCO2: 27 MMOL/L (ref 24–29)
POC TCO2: 29 MMOL/L (ref 23–27)
POTASSIUM BLD-SCNC: 3.7 MMOL/L (ref 3.5–5.1)
POTASSIUM BLD-SCNC: 3.8 MMOL/L (ref 3.5–5.1)
POTASSIUM BLD-SCNC: 4.2 MMOL/L (ref 3.5–5.1)
POTASSIUM BLD-SCNC: 4.4 MMOL/L (ref 3.5–5.1)
POTASSIUM BLD-SCNC: 4.5 MMOL/L (ref 3.5–5.1)
POTASSIUM BLD-SCNC: 4.7 MMOL/L (ref 3.5–5.1)
SAMPLE: ABNORMAL
SODIUM BLD-SCNC: 137 MMOL/L (ref 136–145)
SODIUM BLD-SCNC: 140 MMOL/L (ref 136–145)
SODIUM BLD-SCNC: 141 MMOL/L (ref 136–145)
SODIUM BLD-SCNC: 142 MMOL/L (ref 136–145)
SODIUM BLD-SCNC: 142 MMOL/L (ref 136–145)
SODIUM BLD-SCNC: 144 MMOL/L (ref 136–145)

## 2023-03-13 PROCEDURE — 99024 PR POST-OP FOLLOW-UP VISIT: ICD-10-PCS | Mod: ,,, | Performed by: THORACIC SURGERY (CARDIOTHORACIC VASCULAR SURGERY)

## 2023-03-13 PROCEDURE — 3008F PR BODY MASS INDEX (BMI) DOCUMENTED: ICD-10-PCS | Mod: CPTII,,, | Performed by: THORACIC SURGERY (CARDIOTHORACIC VASCULAR SURGERY)

## 2023-03-13 PROCEDURE — 1159F MED LIST DOCD IN RCRD: CPT | Mod: CPTII,,, | Performed by: THORACIC SURGERY (CARDIOTHORACIC VASCULAR SURGERY)

## 2023-03-13 PROCEDURE — 99024 POSTOP FOLLOW-UP VISIT: CPT | Mod: ,,, | Performed by: THORACIC SURGERY (CARDIOTHORACIC VASCULAR SURGERY)

## 2023-03-13 PROCEDURE — 1160F PR REVIEW ALL MEDS BY PRESCRIBER/CLIN PHARMACIST DOCUMENTED: ICD-10-PCS | Mod: CPTII,,, | Performed by: THORACIC SURGERY (CARDIOTHORACIC VASCULAR SURGERY)

## 2023-03-13 PROCEDURE — 3078F PR MOST RECENT DIASTOLIC BLOOD PRESSURE < 80 MM HG: ICD-10-PCS | Mod: CPTII,,, | Performed by: THORACIC SURGERY (CARDIOTHORACIC VASCULAR SURGERY)

## 2023-03-13 PROCEDURE — 1159F PR MEDICATION LIST DOCUMENTED IN MEDICAL RECORD: ICD-10-PCS | Mod: CPTII,,, | Performed by: THORACIC SURGERY (CARDIOTHORACIC VASCULAR SURGERY)

## 2023-03-13 PROCEDURE — 1160F RVW MEDS BY RX/DR IN RCRD: CPT | Mod: CPTII,,, | Performed by: THORACIC SURGERY (CARDIOTHORACIC VASCULAR SURGERY)

## 2023-03-13 PROCEDURE — 3075F SYST BP GE 130 - 139MM HG: CPT | Mod: CPTII,,, | Performed by: THORACIC SURGERY (CARDIOTHORACIC VASCULAR SURGERY)

## 2023-03-13 PROCEDURE — 3008F BODY MASS INDEX DOCD: CPT | Mod: CPTII,,, | Performed by: THORACIC SURGERY (CARDIOTHORACIC VASCULAR SURGERY)

## 2023-03-13 PROCEDURE — 3075F PR MOST RECENT SYSTOLIC BLOOD PRESS GE 130-139MM HG: ICD-10-PCS | Mod: CPTII,,, | Performed by: THORACIC SURGERY (CARDIOTHORACIC VASCULAR SURGERY)

## 2023-03-13 PROCEDURE — 3078F DIAST BP <80 MM HG: CPT | Mod: CPTII,,, | Performed by: THORACIC SURGERY (CARDIOTHORACIC VASCULAR SURGERY)

## 2023-03-13 RX ORDER — ATORVASTATIN CALCIUM 40 MG/1
40 TABLET, FILM COATED ORAL
COMMUNITY
Start: 2023-02-28

## 2023-03-21 ENCOUNTER — EXTERNAL HOME HEALTH (OUTPATIENT)
Dept: HOME HEALTH SERVICES | Facility: HOSPITAL | Age: 63
End: 2023-03-21
Payer: COMMERCIAL

## 2023-03-27 PROBLEM — Z95.2 S/P AVR (AORTIC VALVE REPLACEMENT): Status: ACTIVE | Noted: 2023-03-27

## 2023-03-27 NOTE — PROGRESS NOTES
Patient is status post avr (b) / coronary artery bypass grafting doing well without complaints   Vital signs stable/afebrile   Regular rate and rhythm without murmurs rubs or gallops  Coarse breath sounds bilaterally   Wounds CDI   Echocardiogram reviewed   A/P:  Doing well without complaints.  Plan per Cardiology

## 2023-09-25 ENCOUNTER — OFFICE VISIT (OUTPATIENT)
Dept: CARDIAC SURGERY | Facility: CLINIC | Age: 63
End: 2023-09-25
Payer: COMMERCIAL

## 2023-09-25 VITALS
SYSTOLIC BLOOD PRESSURE: 144 MMHG | WEIGHT: 129 LBS | DIASTOLIC BLOOD PRESSURE: 97 MMHG | BODY MASS INDEX: 17.1 KG/M2 | HEART RATE: 79 BPM | HEIGHT: 73 IN

## 2023-09-25 DIAGNOSIS — Z95.2 S/P AVR (AORTIC VALVE REPLACEMENT): ICD-10-CM

## 2023-09-25 DIAGNOSIS — I25.10 CORONARY ARTERY DISEASE INVOLVING NATIVE CORONARY ARTERY OF NATIVE HEART WITHOUT ANGINA PECTORIS: ICD-10-CM

## 2023-09-25 DIAGNOSIS — Z95.1 HX OF CABG: ICD-10-CM

## 2023-09-25 DIAGNOSIS — Z95.2 S/P AVR (AORTIC VALVE REPLACEMENT): Primary | ICD-10-CM

## 2023-09-25 PROCEDURE — 1160F RVW MEDS BY RX/DR IN RCRD: CPT | Mod: CPTII,,, | Performed by: THORACIC SURGERY (CARDIOTHORACIC VASCULAR SURGERY)

## 2023-09-25 PROCEDURE — 1160F PR REVIEW ALL MEDS BY PRESCRIBER/CLIN PHARMACIST DOCUMENTED: ICD-10-PCS | Mod: CPTII,,, | Performed by: THORACIC SURGERY (CARDIOTHORACIC VASCULAR SURGERY)

## 2023-09-25 PROCEDURE — 3080F DIAST BP >= 90 MM HG: CPT | Mod: CPTII,,, | Performed by: THORACIC SURGERY (CARDIOTHORACIC VASCULAR SURGERY)

## 2023-09-25 PROCEDURE — 1159F MED LIST DOCD IN RCRD: CPT | Mod: CPTII,,, | Performed by: THORACIC SURGERY (CARDIOTHORACIC VASCULAR SURGERY)

## 2023-09-25 PROCEDURE — 4010F PR ACE/ARB THEARPY RXD/TAKEN: ICD-10-PCS | Mod: CPTII,,, | Performed by: THORACIC SURGERY (CARDIOTHORACIC VASCULAR SURGERY)

## 2023-09-25 PROCEDURE — 4010F ACE/ARB THERAPY RXD/TAKEN: CPT | Mod: CPTII,,, | Performed by: THORACIC SURGERY (CARDIOTHORACIC VASCULAR SURGERY)

## 2023-09-25 PROCEDURE — 99214 OFFICE O/P EST MOD 30 MIN: CPT | Mod: ,,, | Performed by: THORACIC SURGERY (CARDIOTHORACIC VASCULAR SURGERY)

## 2023-09-25 PROCEDURE — 99214 PR OFFICE/OUTPT VISIT, EST, LEVL IV, 30-39 MIN: ICD-10-PCS | Mod: ,,, | Performed by: THORACIC SURGERY (CARDIOTHORACIC VASCULAR SURGERY)

## 2023-09-25 PROCEDURE — 1159F PR MEDICATION LIST DOCUMENTED IN MEDICAL RECORD: ICD-10-PCS | Mod: CPTII,,, | Performed by: THORACIC SURGERY (CARDIOTHORACIC VASCULAR SURGERY)

## 2023-09-25 PROCEDURE — 3077F SYST BP >= 140 MM HG: CPT | Mod: CPTII,,, | Performed by: THORACIC SURGERY (CARDIOTHORACIC VASCULAR SURGERY)

## 2023-09-25 PROCEDURE — 3008F PR BODY MASS INDEX (BMI) DOCUMENTED: ICD-10-PCS | Mod: CPTII,,, | Performed by: THORACIC SURGERY (CARDIOTHORACIC VASCULAR SURGERY)

## 2023-09-25 PROCEDURE — 3080F PR MOST RECENT DIASTOLIC BLOOD PRESSURE >= 90 MM HG: ICD-10-PCS | Mod: CPTII,,, | Performed by: THORACIC SURGERY (CARDIOTHORACIC VASCULAR SURGERY)

## 2023-09-25 PROCEDURE — 3008F BODY MASS INDEX DOCD: CPT | Mod: CPTII,,, | Performed by: THORACIC SURGERY (CARDIOTHORACIC VASCULAR SURGERY)

## 2023-09-25 PROCEDURE — 3077F PR MOST RECENT SYSTOLIC BLOOD PRESSURE >= 140 MM HG: ICD-10-PCS | Mod: CPTII,,, | Performed by: THORACIC SURGERY (CARDIOTHORACIC VASCULAR SURGERY)

## 2023-09-25 RX ORDER — LISINOPRIL 20 MG/1
20 TABLET ORAL DAILY
COMMUNITY
Start: 2023-08-29

## 2023-09-25 NOTE — ASSESSMENT & PLAN NOTE
Patient is status post avr/coronary artery bypass grafting doing well without complaints   Vital signs stable/afebrile   Regular rate and rhythm without murmurs rubs or gallops  Coarse breath sounds bilaterally   Wounds CDI   Echocardiogram reviewed   A/P:  Doing well without complaints.  Plan per Cardiology

## 2024-09-26 ENCOUNTER — OFFICE VISIT (OUTPATIENT)
Dept: CARDIAC SURGERY | Facility: CLINIC | Age: 64
End: 2024-09-26
Payer: COMMERCIAL

## 2024-09-26 VITALS
HEART RATE: 66 BPM | BODY MASS INDEX: 18.02 KG/M2 | DIASTOLIC BLOOD PRESSURE: 87 MMHG | WEIGHT: 136 LBS | SYSTOLIC BLOOD PRESSURE: 176 MMHG | HEIGHT: 73 IN | OXYGEN SATURATION: 100 %

## 2024-09-26 DIAGNOSIS — Z95.2 S/P AVR (AORTIC VALVE REPLACEMENT): Primary | ICD-10-CM

## 2024-09-26 NOTE — PROGRESS NOTES
Patient is status post AVR (B) / coronary artery bypass grafting doing well without complaints   Vital signs stable/afebrile   Regular rate and rhythm without murmurs rubs or gallops  Coarse breath sounds bilaterally   Wounds CDI   Echocardiogram reviewed   A/P:  Doing well without complaints.  Plan per Cardiology

## (undated) DEVICE — NDL HYPODERMIC BLUNT 18G 1.5IN

## (undated) DEVICE — GLOVE SURG BIOGEL LATEX SZ 7.5

## (undated) DEVICE — GLOVE PROTEXIS HYDROGEL SZ6.5

## (undated) DEVICE — DRAPE SLUSH WARMER WITH DISC

## (undated) DEVICE — BOWL STERILE LARGE 32OZ

## (undated) DEVICE — ELECTRODE BLADE INSULATED 1 IN

## (undated) DEVICE — TRAY CATH FOL SIL TEMP 10 16FR

## (undated) DEVICE — Device

## (undated) DEVICE — SUT VICRYL 1 OB 36 CTX

## (undated) DEVICE — SUT PROLENE 4-0 SH BLU 36IN

## (undated) DEVICE — DRAIN CHEST DRY SUCTION

## (undated) DEVICE — CONNECTOR 3/8X3/8 STRAIGHT

## (undated) DEVICE — KIT VAVD

## (undated) DEVICE — SUT PROLENE 6-0 C-1 30IN BL

## (undated) DEVICE — WIRE INTRAMYOCARDIAL TEMP

## (undated) DEVICE — SUT 1 36IN PDS II VIO MONO

## (undated) DEVICE — SET PERFUSION

## (undated) DEVICE — SOL IRR NACL .9% 3000ML

## (undated) DEVICE — SYS VIRTUOSAPH PLUS EVM

## (undated) DEVICE — BAG MEDI-PLAST DECANTER C-FLOW

## (undated) DEVICE — HEMOSTAT SURGICEL NU-KNIT 6X9

## (undated) DEVICE — PLEDGET TFLN FELT 9.5X4.8 ST

## (undated) DEVICE — STOPCOCK 4-WAY

## (undated) DEVICE — CANNULA MC2 OVAL NVENT 32/40FR

## (undated) DEVICE — SOL PLASMALYTE PH 7.4 1000ML

## (undated) DEVICE — PACK VARISPREED BATTERY

## (undated) DEVICE — DRESSING TELFA + RECT 6X10IN

## (undated) DEVICE — GAUZE VISTEC XR DTECT 16 4X4IN

## (undated) DEVICE — SOL ELECTROLYTE PH 7.4 500ML

## (undated) DEVICE — SUT ETHIBOND 0 CR/CTX 8-18

## (undated) DEVICE — CARTRIDGE SILV 4CHAN 2.0-3.5MG

## (undated) DEVICE — KIT SURGICAL TURNOVER

## (undated) DEVICE — SUT PROLENE BL 4-0 RB-1 36IN

## (undated) DEVICE — PATCH SEALANT EVARREST 2X4

## (undated) DEVICE — SEE MEDLINE ITEM 159606

## (undated) DEVICE — KIT C.A.T.S. FAST START 4/CASE

## (undated) DEVICE — GLOVE PROTEXIS BLUE LATEX 7

## (undated) DEVICE — SUT BONE WAX 2.5 GRMS 12/BX

## (undated) DEVICE — NDL ASPIRATOR AIR 16G

## (undated) DEVICE — SUT PROLENE RB-1 BL MONO

## (undated) DEVICE — SEALANT VISTASEAL FIBRIN 10ML

## (undated) DEVICE — SUT MAXON GRN 0 CLSR 27IN

## (undated) DEVICE — UNIT AUTOTRANSFUSION PL 450ML

## (undated) DEVICE — GLOVE PROTEXIS PI SYN SURG 7.5

## (undated) DEVICE — APPLICATOR ENDOSCP 32CM5MM2TIP

## (undated) DEVICE — CANNULA MC2 NVENT .5IN 28/36FR

## (undated) DEVICE — SENSOR LOW LEVEL OXYGEN

## (undated) DEVICE — SYR 50CC LL

## (undated) DEVICE — ADHESIVE DERMABOND ADVANCED

## (undated) DEVICE — CANNULA AORT ROOT VNT LN 14GA

## (undated) DEVICE — KIT CATHGARD DBL LUMN 9FRX11.5

## (undated) DEVICE — SOL NACL IRR 3000ML

## (undated) DEVICE — DRESSING TELFA + BARR 4X6IN

## (undated) DEVICE — CANNULA SOFT FLOW ANG 14IN 21F

## (undated) DEVICE — MARKER SURG SM U SHAPE GRAFT

## (undated) DEVICE — CATH ALL PUR URTHL 20FR

## (undated) DEVICE — CARTRIDGE HEPARIN DOSE

## (undated) DEVICE — COR KNOT QUICK LOAD SINGLES

## (undated) DEVICE — SUT MONOCRYL PLUS UD 3-0 27

## (undated) DEVICE — PENCIL MEGADYNE E-Z CLEAN BTTN

## (undated) DEVICE — SUT ETHBND XTRA 1 OS-8 30IN

## (undated) DEVICE — CATH THOR STND RGHT ANG 28F

## (undated) DEVICE — SUT 2 30IN SILK BLK BRAIDE

## (undated) DEVICE — SOL LAC RINGERS 1000ML INJ

## (undated) DEVICE — TUBING PRSS MON M LL CONN 72IN

## (undated) DEVICE — APPLICATOR SURG 2 SPRY 1 PLUNG

## (undated) DEVICE — INSERT INTRACK CLAMP ULT 66MM

## (undated) DEVICE — SUT PROLENE 4-0 RB-1 BL MO

## (undated) DEVICE — SUT VICRYL 2-0 36 CT-1

## (undated) DEVICE — CANNULA PERF ART RA 8X3.5MM

## (undated) DEVICE — CONTAINER SPECIMEN SCREW 4OZ

## (undated) DEVICE — KNIFE OPTIMUM STR GRN 15DEG

## (undated) DEVICE — SUT PROLENE 7-0 BV175-6

## (undated) DEVICE — SOL IRRI STRL WATER 1000ML

## (undated) DEVICE — SOL .9NACL PF 100 ML

## (undated) DEVICE — BLOWER MISTER

## (undated) DEVICE — PAD DEFIB CADENCE ADULT R2

## (undated) DEVICE — DRESSING ANTIMIC ISLAND 4X10IN

## (undated) DEVICE — NDL HYPO REG 25G X 1 1/2

## (undated) DEVICE — SOL NORMAL USPCA 0.9%

## (undated) DEVICE — GLOVE BIOGEL 7.5

## (undated) DEVICE — HOLDER STRIP-T SELF ADH 2X10IN

## (undated) DEVICE — CARTRIDGE HEPARIN 2 CHNNL ACT

## (undated) DEVICE — COR KNOT QUICK LOAD

## (undated) DEVICE — GLOVE PROTEXIS LTX MICRO 8